# Patient Record
Sex: FEMALE | Employment: STUDENT | URBAN - METROPOLITAN AREA
[De-identification: names, ages, dates, MRNs, and addresses within clinical notes are randomized per-mention and may not be internally consistent; named-entity substitution may affect disease eponyms.]

---

## 2023-07-24 ENCOUNTER — OFFICE VISIT (OUTPATIENT)
Age: 23
End: 2023-07-24
Payer: COMMERCIAL

## 2023-07-24 VITALS — HEIGHT: 61 IN | RESPIRATION RATE: 14 BRPM | WEIGHT: 106 LBS | BODY MASS INDEX: 20.01 KG/M2

## 2023-07-24 DIAGNOSIS — S06.0XAA CONCUSSION WITH LOSS OF CONSCIOUSNESS STATUS UNKNOWN, INITIAL ENCOUNTER: Primary | ICD-10-CM
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
## 2023-07-24 DIAGNOSIS — M99.06 LOWER LIMB REGION SOMATIC DYSFUNCTION: ICD-10-CM

## 2023-07-24 DIAGNOSIS — M99.04 SACRAL REGION SOMATIC DYSFUNCTION: ICD-10-CM

## 2023-07-24 DIAGNOSIS — M99.08 RIB CAGE REGION SOMATIC DYSFUNCTION: ICD-10-CM

## 2023-07-24 DIAGNOSIS — M99.07 UPPER EXTREMITY SOMATIC DYSFUNCTION: ICD-10-CM

## 2023-07-24 DIAGNOSIS — M99.02 THORACIC REGION SOMATIC DYSFUNCTION: ICD-10-CM

## 2023-07-24 DIAGNOSIS — M99.05 PELVIC SOMATIC DYSFUNCTION: ICD-10-CM

## 2023-07-24 DIAGNOSIS — M99.03 LUMBAR REGION SOMATIC DYSFUNCTION: ICD-10-CM

## 2023-07-24 DIAGNOSIS — M99.01 CERVICAL SOMATIC DYSFUNCTION: ICD-10-CM

## 2023-07-24 DIAGNOSIS — M99.09 SOMATIC DYSFUNCTION OF ABDOMINAL REGION: ICD-10-CM

## 2023-07-24 PROCEDURE — 99204 OFFICE O/P NEW MOD 45 MIN: CPT | Performed by: FAMILY MEDICINE

## 2023-07-24 PROCEDURE — 98929 OSTEOPATH MANJ 9-10 REGIONS: CPT | Performed by: FAMILY MEDICINE

## 2023-07-24 RX ORDER — MIRTAZAPINE 7.5 MG/1
22.5-45 TABLET, FILM COATED ORAL NIGHTLY
Qty: 150 TABLET | Refills: 0 | Status: SHIPPED | OUTPATIENT
Start: 2023-07-24
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
## 2023-07-24 NOTE — PROGRESS NOTES
AVS reviewed: yes,   HEP: N/A  Resources Provided: no   Patient questions/concerns answered: yes,   Patient verbalized understanding of treatment plan: yes, Chemstrips per protocol

## 2023-08-01 ENCOUNTER — HOSPITAL ENCOUNTER (OUTPATIENT)
Facility: HOSPITAL | Age: 23
Setting detail: RECURRING SERIES
Discharge: HOME | End: 2023-08-04
Payer: MEDICAID

## 2023-08-01 DIAGNOSIS — S06.0XAA CONCUSSION WITH LOSS OF CONSCIOUSNESS STATUS UNKNOWN, INITIAL ENCOUNTER: Primary | ICD-10-CM
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
## 2023-08-01 PROCEDURE — 97163 PT EVAL HIGH COMPLEX 45 MIN: CPT
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
## 2023-08-07 ENCOUNTER — OFFICE VISIT (OUTPATIENT)
Age: 23
End: 2023-08-07
Payer: MEDICAID

## 2023-08-07 VITALS — WEIGHT: 106 LBS | HEIGHT: 61 IN | RESPIRATION RATE: 14 BRPM | BODY MASS INDEX: 20.01 KG/M2

## 2023-08-07 DIAGNOSIS — M99.05 PELVIC SOMATIC DYSFUNCTION: ICD-10-CM

## 2023-08-07 DIAGNOSIS — S06.0XAD CONCUSSION WITH LOSS OF CONSCIOUSNESS STATUS UNKNOWN, SUBSEQUENT ENCOUNTER: Primary | ICD-10-CM

## 2023-08-07 DIAGNOSIS — M99.08 RIB CAGE REGION SOMATIC DYSFUNCTION: ICD-10-CM

## 2023-08-07 DIAGNOSIS — M99.01 CERVICAL SOMATIC DYSFUNCTION: ICD-10-CM
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
## 2023-08-07 DIAGNOSIS — M99.04 SACRAL REGION SOMATIC DYSFUNCTION: ICD-10-CM

## 2023-08-07 DIAGNOSIS — M99.03 LUMBAR REGION SOMATIC DYSFUNCTION: ICD-10-CM

## 2023-08-07 DIAGNOSIS — M99.06 LOWER LIMB REGION SOMATIC DYSFUNCTION: ICD-10-CM

## 2023-08-07 DIAGNOSIS — M99.07 UPPER EXTREMITY SOMATIC DYSFUNCTION: ICD-10-CM

## 2023-08-07 DIAGNOSIS — M99.02 THORACIC REGION SOMATIC DYSFUNCTION: ICD-10-CM

## 2023-08-07 DIAGNOSIS — M99.09 SOMATIC DYSFUNCTION OF ABDOMINAL REGION: ICD-10-CM

## 2023-08-07 PROCEDURE — 98929 OSTEOPATH MANJ 9-10 REGIONS: CPT | Performed by: FAMILY MEDICINE

## 2023-08-07 PROCEDURE — 99214 OFFICE O/P EST MOD 30 MIN: CPT | Performed by: FAMILY MEDICINE

## 2023-08-07 NOTE — PROGRESS NOTES
region  [address] [place] [address] 9-10 [address]          Patient (or guardian if minor) verbalizes understanding of evaluation and plan. Verbal consent obtained. Cervical, Thoracic, Rib, Lumbar, Pelvic, Sacral, Upper Ext, Lower Ext, and Abdominal SD treated with Myofascial and ME. Correction of previous malalignments verified after Tx. Pt tolerated well. Notes improvement of Sx and pain is now rated 0/10. HEP/stretches daily. Discussed stretching/strengthening/posture. Will continue avoid aggravating activities and start PT and speech w/ continued remeron 22.5mg at bed. Return 4 weeks. School starts [id number]. Total time spent on encounter including chart/imaging/lab review and evaluation/documentation/demo home program/coordination of care/form completion but not including time for any procedures/manipulation 32 minutes.

## 2023-08-15 ENCOUNTER — HOSPITAL ENCOUNTER (OUTPATIENT)
Facility: HOSPITAL | Age: 23
Setting detail: RECURRING SERIES
Discharge: HOME | End: 2023-08-18
Payer: MEDICAID

## 2023-08-15 PROCEDURE — 97530 THERAPEUTIC ACTIVITIES: CPT

## 2023-08-15 PROCEDURE — 96125 COGNITIVE TEST BY HC PRO: CPT

## 2023-08-15 PROCEDURE — 97110 THERAPEUTIC EXERCISES: CPT

## 2023-08-15 PROCEDURE — 97116 GAIT TRAINING THERAPY: CPT

## 2023-08-15 NOTE — PROGRESS NOTES
tissue restrictions, analyze and cue for proper movement patterns, analyze and modify for postural abnormalities, analyze and address imbalance/dizziness, and instruct in home and community integration to address functional deficits and attain remaining goals. Progress toward goals / Updated goals:  []  See Progress Note/Recertification    Progressing toward goals:     Short Term Goals: To be accomplished in 2-4 weeks   Patient will demonstrate compliance with HEP for symptom management. Status at IE NA  Current Status Initiated  Long Term Goals: To be accomplished in 4-8 weeks   Patient will demonstrate independence with HEP for symptom management. Status at IE NA  Current Status Initiated  2. Patient will score less than or equal to 30/132 on CSI to allow improved activity tolerance. Status at /132  Current Status 92/132  3. Patient will demonstrate total scores < 10 on VOMS to allow improved activity tolerance. Status at IE Max total 22  4. Patient will maintain sharpened Romberg eyes open 30\"B to increase safety in challenging environments.   Status at IE 1\" left/3\" right    PLAN  Yes  Continue plan of care  [x]  Upgrade activities as tolerated  []  Discharge due to :  []  Other:    Adrianne Sullivan, PT    8/15/2023    2:43 PM    Future Appointments   Date Time Provider [address]  [facility]   8/17/2023 11:00 AM MD Doc [facility]   8/17/2023  2:00 PM Lavonia Boxer, PTA [facility]   8/23/2023  9:20 AM Lavonia Boxer, PTA [facility]   8/24/2023 10:00 AM Lavonia Boxer, PTA [facility]   9/5/2023  2:40 PM Orlando Judd DO VOSSTR BS AMB   9/15/2023  9:20 AM DO FIDE Turcios BS AMB
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
## 2023-08-15 NOTE — PROGRESS NOTES
[facility] THERAPY  [address] [facility], [address] [place] [address] [address] [phone]  Fx: [phone]    Plan of Care/ Statement of Necessity for Speech Therapy Services    Patient Name: Ene Brady : 2000   Medical   Diagnosis: Dysphasia [R47.02]  Concussion [S06. 0XAA] Treatment Diagnosis: R41.841 Cognitive communication deficit   Onset Date: 2023     Referral Source: Luis M Esquivel DO Start of Care [facility]): 8/15/23   Prior Hospitalization: See medical history Provider #: [id number]   Prior Level of Function: All aspects of speech/language, cognition, voice, and swallowing WNLs   Comorbidities: Headache/migraine, neck/back pain, depression, anxiety, IBS, interstitial cystitis, sleep dysfunction     The Plan of Care and following information is based on the information from the initial evaluation. Assessment/ key information:   OUTPATIENT COGNITIVE-COMMUNICATION EVALUATION  SLP administered portions of the [facility] [employer] [employer] Assessment-2nd Edition, a comprehensive, norm-referenced, assessment battery designed to identify, describe, and quantify cognitive-linguistic problems. The following are the pt's performance in each subtest.      Subtest Raw Score Standardized Score Percentile Severity   I. Immediate Memory 25 12 75 Moderate   II. Recent Memory 20 8 25 Marked   III. Temporal Orientation (Recent Memory) 26 10 50 Marked   IV. Temporal Orientation (Remote Memory) Not administered         V. Spatial Orientation Not administered         VI. Orientation to Environment Not administered         VII. Recall of General Information 20 9 37 Marked   VIII. Problem Solving and Abstract Reasoning 20 9 37 Marked   IX. Organization Not administered         X.  Auditory Processing and Retention 25 10 50 Marked       Impression: Pt demonstrates with mod-severe cognitive-linguistic impairment c/b deficits in STM, LTM, delayed recall, problem-solving, and auditory

## 2023-08-17 ENCOUNTER — HOSPITAL ENCOUNTER (OUTPATIENT)
Facility: HOSPITAL | Age: 23
Setting detail: RECURRING SERIES
Discharge: HOME | End: 2023-08-20
Payer: MEDICAID

## 2023-08-17 PROCEDURE — 97530 THERAPEUTIC ACTIVITIES: CPT

## 2023-08-17 PROCEDURE — 97112 NEUROMUSCULAR REEDUCATION: CPT

## 2023-08-17 NOTE — PROGRESS NOTES
Open Access Colonoscopy ordered.  [facility]      Dx: Colon cancer screening   PHYSICAL / OCCUPATIONAL THERAPY - DAILY TREATMENT NOTE (updated )    Patient Name: Anika Dunaway    Date: 2023    : 2000  Insurance: Payor: Moe Michelle / Plan: Luis Manuel NUNN HEALTHKEEPERS PLUS / Product Type: *No Product type* /      Patient  verified yes     Visit #   Current / Total 3 8-16   Time   In / Out 2:01 2:40   Pain   In / Out 4 mid to lower back 4 mid to lower back   5/10 HA   Subjective Functional Status/Changes: Pt reports her neck felt looser after doing the neck exercises last session. Pt reports she is still taking medication to help her get to sleep and stay asleep. Next PN/ RC due 23/NA  Auth due 23    TREATMENT AREA =  Unsteadiness on feet [R26.81]  Concussion [S06. 0XAA]    OBJECTIVE    Therapeutic Procedures: Tx Min Billable or 1:1 Min (if diff from Tx Min) Procedure, Rationale, Specifics   10  29091 Therapeutic Activity (timed):  use of dynamic activities replicating functional movements to increase ROM, strength, coordination, balance, and proprioception in order to improve patient's ability to progress to PLOF and address remaining functional goals. (see flow sheet as applicable)     Details if applicable:     29  03811 Neuromuscular Re-Education (timed):  improve balance, coordination, kinesthetic sense, posture, core stability and proprioception to improve patient's ability to develop conscious control of individual muscles and awareness of position of extremities in order to progress to PLOF and address remaining functional goals.  (see flow sheet as applicable)     Details if applicable:     44  [facility] Totals Reminder: bill using total billable min of TIMED therapeutic procedures (example: do not include dry needle or estim unattended, both untimed codes, in totals to left)  8-22 min = 1 unit; 23-37 min = 2 units; 38-52 min = 3 units; 53-67 min = 4 units; 68-82 min = 5 units   Total Total     [x]  Patient Education billed concurrently

## 2023-08-23 ENCOUNTER — HOSPITAL ENCOUNTER (OUTPATIENT)
Facility: HOSPITAL | Age: 23
Setting detail: RECURRING SERIES
Discharge: HOME | End: 2023-08-26
Payer: MEDICAID

## 2023-08-23 PROCEDURE — 97530 THERAPEUTIC ACTIVITIES: CPT

## 2023-08-23 PROCEDURE — 97110 THERAPEUTIC EXERCISES: CPT

## 2023-08-23 PROCEDURE — 97112 NEUROMUSCULAR REEDUCATION: CPT

## 2023-08-23 NOTE — PROGRESS NOTES
PHYSICAL / OCCUPATIONAL THERAPY - DAILY TREATMENT NOTE (updated )    Patient Name: Allie Quintero    Date: 2023    : 2000  Insurance: Payor: Jamey Chopra / Plan: Meet NUNN HEALTHKEEPERS PLUS / Product Type: *No Product type* /      Patient  verified yes     Visit #   Current / Total 4 8-16   Time   In / Out 9:20 9:58   Pain   In / Out 1-2 stiffness C/S  0   Subjective Functional Status/Changes: Pt reports just woke up so hasn't had any symptoms today yet. Pt notes her symptoms aren't typically too bad in the mornings, worsen as the day goes on. Pt reports difficulty staying asleep, but does have thyroid issues too that she just found out about. Pt states she has stiffness in her neck when she wakes up in the mornings. Pt reports mild symptoms with doing the [employer] exercises, states she gets 6-7 reps most of the time and can make it to 10 sometimes. Next PN due 23  RC NA  Auth date 23-16 visits auth    TREATMENT AREA =  Unsteadiness on feet [R26.81]  Concussion [S06. 0XAA]    OBJECTIVE    Therapeutic Procedures: Tx Min Billable or 1:1 Min (if diff from Tx Min) Procedure, Rationale, Specifics   15  32299 Therapeutic Exercise (timed):  increase ROM, strength, coordination, balance, and proprioception to improve patient's ability to progress to PLOF and address remaining functional goals. (see flow sheet as applicable)     Details if applicable:       13  74823 Therapeutic Activity (timed):  use of dynamic activities replicating functional movements to increase ROM, strength, coordination, balance, and proprioception in order to improve patient's ability to progress to PLOF and address remaining functional goals.   (see flow sheet as applicable)     Details if applicable:     10  71773 Neuromuscular Re-Education (timed):  improve balance, coordination, kinesthetic sense, posture, core stability and proprioception to improve patient's ability to

## 2023-08-24 ENCOUNTER — HOSPITAL ENCOUNTER (OUTPATIENT)
Facility: HOSPITAL | Age: 23
Setting detail: RECURRING SERIES
Discharge: HOME | End: 2023-08-27
Payer: MEDICAID

## 2023-08-24 PROCEDURE — 97530 THERAPEUTIC ACTIVITIES: CPT

## 2023-08-24 PROCEDURE — 97112 NEUROMUSCULAR REEDUCATION: CPT

## 2023-08-24 NOTE — PROGRESS NOTES
PHYSICAL / OCCUPATIONAL THERAPY - DAILY TREATMENT NOTE (updated )    Patient Name: Ally Fajardo    Date: 2023    : 2000  Insurance: Payor: Hortensia Iglesias / Plan: Bradly NUNN HEALTHKEEPERS PLUS / Product Type: *No Product type* /      Patient  verified yes     Visit #   Current / Total 5 8-16   Time   In / Out 10:05 10:40   Pain   In / Out 2 neck 2 neck   Subjective Functional Status/Changes: Pt reports nausea didn't last after her last PT session. Pt reports doing okay with the balance exercises at home, feels a little dizzy with the eyes closed, but it doesn't last at all. Next PN due 23  RC NA  Auth date 23-16 visits auth    TREATMENT AREA =  Unsteadiness on feet [R26.81]  Concussion [S06. 0XAA]    OBJECTIVE    Therapeutic Procedures: Tx Min Billable or 1:1 Min (if diff from Tx Min) Procedure, Rationale, Specifics   10  32013 Therapeutic Activity (timed):  use of dynamic activities replicating functional movements to increase ROM, strength, coordination, balance, and proprioception in order to improve patient's ability to progress to PLOF and address remaining functional goals. (see flow sheet as applicable)     Details if applicable:     25  78129 Neuromuscular Re-Education (timed):  improve balance, coordination, kinesthetic sense, posture, core stability and proprioception to improve patient's ability to develop conscious control of individual muscles and awareness of position of extremities in order to progress to PLOF and address remaining functional goals.  (see flow sheet as applicable)     Details if applicable:     28  [facility] Totals Reminder: bill using total billable min of TIMED therapeutic procedures (example: do not include dry needle or estim unattended, both untimed codes, in totals to left)  8-22 min = 1 unit; 23-37 min = 2 units; 38-52 min = 3 units; 53-67 min = 4 units; 68-82 min = 5 units   Total Total     [x]  Patient Education billed

## 2023-08-29 ENCOUNTER — APPOINTMENT (OUTPATIENT)
Facility: HOSPITAL | Age: 23
End: 2023-08-29
Payer: MEDICAID

## 2023-08-31 ENCOUNTER — HOSPITAL ENCOUNTER (OUTPATIENT)
Facility: HOSPITAL | Age: 23
Setting detail: RECURRING SERIES
End: 2023-08-31
Payer: MEDICAID

## 2023-08-31 PROCEDURE — 97110 THERAPEUTIC EXERCISES: CPT

## 2023-08-31 PROCEDURE — 97112 NEUROMUSCULAR REEDUCATION: CPT

## 2023-08-31 PROCEDURE — 97530 THERAPEUTIC ACTIVITIES: CPT

## 2023-08-31 NOTE — PROGRESS NOTES
[address] [facility],[address] [facility] AT [facility]  [address] [facility] 300. [facility], [address] [place], [place] [address]y 6   Phone: [phone] Fax: [phone]  PROGRESS NOTE  Patient Name: Katrin Lane : 2000   Treatment/Medical Diagnosis: R26.81  Unsteadiness on feet  S06. 0XAA Concussion   Referral Source: Alcira Mack DO     Date of Initial Visit: 2023 Attended Visits: 6 Missed Visits: 0     SUMMARY OF TREATMENT  Treatment has consisted of initial evaluation and 5 treatment sessions, which have included ROM/stretching/strengthening exercises, oculomotor and vestibular habituation exercises, balance training, functional mobility/gait training and patient education (including HEP). CURRENT STATUS  Patient has progressed with exercises in clinic and reports compliance with HEP. Patient continues to report headache and neck pain (3-/510 this session), as well as fatigue and light/sound sensitivity. Concussion Symptom Inventory score has fluctuated, but has decreased overall. CSI:   = 106/132  8/15 = 92/132  8 = 74/132   = 43/132   = 75/132  FOTO improved from 43 to 47. Functional Gait Assessment improved from  to , indicating improved balance/decreased fall risk. Gait speed as measured by 10-meter walk test improved from 0.67 m/s to 0.75 m/s, remains limited community ambulation. Patient now able to maintain sharpened Romberg eyes open 30\" right/13\" left. VOMS baseline and max total scores improved from 21 baseline and 22 max; however, patient reporting more increase in symptoms during VOMS and convergence distance increased from 4.7 cm average.     Vestibular Ocular Motor Test     Not Tested Headache  (0-10) Dizzines  (0-10) Nausea  (0-10) Fogginess  (0-10) Totals Comments   Baseline Symptoms (0-10 scale)         3 0 0 3 6     Smooth Pursuits (2 reps each horizontal and vertical)   3 0 0 3 6 Reports eye strain   Saccades - Horizontal (10 reps)   4 3 0 3 10 Reports eye

## 2023-08-31 NOTE — PROGRESS NOTES
PHYSICAL / OCCUPATIONAL THERAPY - DAILY TREATMENT NOTE (updated )    Patient Name: Matt Tesfaye    Date: 2023    : 2000  Insurance: Payor: Patti Graham / [facility] / Product Type: *No Product type* /      Patient  verified Yes     Visit #   Current / Total 6 -16   Time   In / Out 2:00 2:38   Pain   In / Out 5/10 neck, 3/10 HA 5/10 neck, HA   Subjective Functional Status/Changes: Pt reports that she has been really tired, sensitive to light/sound. Pt reports that she was washing dishes yesterday and dropped a bowl, reports that the sound of it dropping made it feel like her eyes were zooming out and in. Pt reports neck stiffness, 5/10, and headache, 3/10. Pt denies dizziness at start of session. Pt reports that she is doing fine with her exercises at home. Next PN/ RC due 2023  Auth due 16 visits, expires 2023 -  this session    TREATMENT AREA =  Unsteadiness on feet [R26.81]  Concussion [S06. 0XAA]    OBJECTIVE    Therapeutic Procedures: Tx Min Billable or 1:1 Min (if diff from Tx Min) Procedure, Rationale, Specifics        20  [id number] Neuromuscular Re-Education (timed):  improve balance, coordination, kinesthetic sense, posture, core stability and proprioception to improve patient's ability to develop conscious control of individual muscles and awareness of position of extremities in order to progress to PLOF and address remaining functional goals. (see flow sheet as applicable)     Details if applicable:  CSI, VOMS, SR EO   8  45199 Therapeutic Activity (timed):  use of dynamic activities replicating functional movements to increase ROM, strength, coordination, balance, and proprioception in order to improve patient's ability to progress to PLOF and address remaining functional goals.   (see flow sheet as applicable)     Details if applicable:  FOTO   10  07120 Gait Training (timed):   to improve safety and dynamic movement with Note/Recertification    PLAN  Yes  Continue plan of care  [x]  Upgrade activities as tolerated  []  Discharge due to :  []  Other:    Regan Barr, PT    8/31/2023    2:05 PM    Future Appointments   Date Time Provider [address]   9/5/2023 10:00  [address], [place] [facility]   9/5/2023 10:40 AM Zuleika Carson, PT [facility]   9/5/2023  2:40 PM DO NUNU MooreSSTR BS AMB   9/7/2023  1:20 PM Regan Barr, PT [facility]   9/7/2023  2:00 PM [address] [place] [address], SLP [facility]   9/12/2023  1:20 PM Zuleika Carson, PT [facility]   9/12/2023  2:00  [address], SLP [facility]   9/14/2023  1:20 PM Zuleika Carson, PT [facility]   9/15/2023  9:20 AM Andrew Schofield DO VSGS BS AMB   9/19/2023  1:20 PM Sandi Yang PTA [facility]   9/19/2023  2:00  [address], [place] [facility]   9/22/2023 12:40 PM Ana Laura Broderick PTA [facility]   9/22/2023  1:20  [address], [place] [facility]   9/26/2023  1:20  [address], [place] [facility]   9/26/2023  2:00 PM Regan Barr, PT [facility]   9/28/2023  1:20  [address], [place] [facility]   9/28/2023  2:00 PM Ana Laura Broderick, PTA [facility]   8/19/2024 11:40 AM Antonina Dias PA-C [facility] Jeannine Sched

## 2023-09-05 ENCOUNTER — APPOINTMENT (OUTPATIENT)
Facility: HOSPITAL | Age: 23
End: 2023-09-05
Payer: MEDICAID

## 2023-09-05 ENCOUNTER — OFFICE VISIT (OUTPATIENT)
Age: 23
End: 2023-09-05
Payer: MEDICAID

## 2023-09-05 ENCOUNTER — HOSPITAL ENCOUNTER (OUTPATIENT)
Facility: HOSPITAL | Age: 23
Setting detail: RECURRING SERIES
Discharge: HOME | End: 2023-09-08
Payer: MEDICAID

## 2023-09-05 VITALS — RESPIRATION RATE: 14 BRPM | BODY MASS INDEX: 19.16 KG/M2 | WEIGHT: 115 LBS | HEIGHT: 65 IN

## 2023-09-05 DIAGNOSIS — M99.04 SACRAL REGION SOMATIC DYSFUNCTION: ICD-10-CM

## 2023-09-05 DIAGNOSIS — M99.09 SOMATIC DYSFUNCTION OF ABDOMINAL REGION: ICD-10-CM

## 2023-09-05 DIAGNOSIS — M99.03 LUMBAR REGION SOMATIC DYSFUNCTION: ICD-10-CM
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
## 2023-09-05 DIAGNOSIS — S06.0XAD CONCUSSION WITH LOSS OF CONSCIOUSNESS STATUS UNKNOWN, SUBSEQUENT ENCOUNTER: Primary | ICD-10-CM

## 2023-09-05 DIAGNOSIS — M99.02 THORACIC REGION SOMATIC DYSFUNCTION: ICD-10-CM

## 2023-09-05 DIAGNOSIS — M99.01 CERVICAL SOMATIC DYSFUNCTION: ICD-10-CM

## 2023-09-05 DIAGNOSIS — M99.05 PELVIC SOMATIC DYSFUNCTION: ICD-10-CM

## 2023-09-05 DIAGNOSIS — M99.08 RIB CAGE REGION SOMATIC DYSFUNCTION: ICD-10-CM

## 2023-09-05 DIAGNOSIS — M99.06 LOWER LIMB REGION SOMATIC DYSFUNCTION: ICD-10-CM

## 2023-09-05 DIAGNOSIS — M99.07 UPPER EXTREMITY SOMATIC DYSFUNCTION: ICD-10-CM

## 2023-09-05 PROCEDURE — 99213 OFFICE O/P EST LOW 20 MIN: CPT | Performed by: FAMILY MEDICINE

## 2023-09-05 PROCEDURE — 97530 THERAPEUTIC ACTIVITIES: CPT

## 2023-09-05 PROCEDURE — 97112 NEUROMUSCULAR REEDUCATION: CPT

## 2023-09-05 PROCEDURE — 97110 THERAPEUTIC EXERCISES: CPT

## 2023-09-05 PROCEDURE — 98929 OSTEOPATH MANJ 9-10 REGIONS: CPT | Performed by: FAMILY MEDICINE

## 2023-09-05 NOTE — PROGRESS NOTES
HISTORY OF PRESENT ILLNESS    Pawan Burns 2000 is a 26y.o. year old female comes in today to be evaluated and treated for: concussion    Since last appt has noticed pain is improving with PT 2-3/week and speech therapy has had eval appt, next is 9/7/2023 but pending auth. Pain level 3/10 crown head dull ache will sharp/throb when bad. Using Remeron 22.5mg with benefit. Plans to attend school online starting soon. Photophobia: improved Phonophobia: improved Sleep issues: great Remeron 22.5mg/day More emotional: improved some Dizziness: less Nausea: rare LOC: yes at injury Trouble concentrating/foggy feeling: improved but issues screen time     Hx prior concussion(s): no     Hx depression - Remeron 15mg before this injury. Did have migraines as child. IMAGING: CT head 6/5/2023  No acute traumatic intracranial findings. CT cervical 6/5/2023  No acute fracture or traumatic malalignment of the cervical spine. History reviewed. No pertinent surgical history. Social History     Socioeconomic History    Marital status: Single     Spouse name: None    Number of children: None    Years of education: None    Highest education level: None   Tobacco Use    Smoking status: Never    Smokeless tobacco: Never   Vaping Use    Vaping Use: Never used   Substance and Sexual Activity    Alcohol use: Yes    Drug use: Yes     Types: Marijuana Saud Harm)     Current Outpatient Medications   Medication Sig Dispense Refill    mirtazapine (REMERON) 7.5 MG tablet Take 3-6 tablets by mouth nightly Start 3 tablet at bed. If sleep not improved may increase dose by 1 tablet every 3 days up to max dose 6 tablets. 150 tablet 0    dicyclomine (BENTYL) 10 MG capsule       mirtazapine (REMERON) 15 MG tablet       oxybutynin (DITROPAN XL) 5 MG extended release tablet Take 1 tablet by mouth daily 30 tablet 3     No current facility-administered medications for this visit. History reviewed.  No pertinent past medical

## 2023-09-05 NOTE — PROGRESS NOTES
PHYSICAL THERAPY - DAILY TREATMENT NOTE (updated )    Patient Name: Sundeep Welch    Date: 2023    : 2000  Insurance: Payor: Yesika Drafts / Plan: Genna NUNN [employer]KEEPERS PLUS / Product Type: *No Product type* /      Patient  verified yes     Visit #   Current / Total 7 8-16   Time   In / Out 10:42 11:24   Pain   In / Out 3/10 3/10   Subjective Functional Status/Changes: Reports compliance with HEP. TREATMENT AREA =  Unsteadiness on feet [R26.81]  Concussion [S06. 0XAA]    Next PN/ RC due 2023  Auth due 16 visits, expires 2023 -  this session    OBJECTIVE    Therapeutic Procedures: Tx Min Billable or 1:1 Min (if diff from Tx Min) Procedure, Rationale, Specifics   15 15 [id number] Therapeutic Exercise (timed):  increase ROM, strength, coordination, balance, and proprioception to improve patient's ability to progress to PLOF and address remaining functional goals. (see flow sheet as applicable)     Details if applicable:       19 15 69527 Therapeutic Activity (timed):  use of dynamic activities replicating functional movements to increase ROM, strength, coordination, balance, and proprioception in order to improve patient's ability to progress to PLOF and address remaining functional goals. (see flow sheet as applicable)     Details if applicable:      76869 Neuromuscular Re-Education (timed):  improve balance, coordination, kinesthetic sense, posture, core stability and proprioception to improve patient's ability to develop conscious control of individual muscles and awareness of position of extremities in order to progress to PLOF and address remaining functional goals.  (see flow sheet as applicable)     Details if applicable:     42 45 [facility] Totals Reminder: bill using total billable min of TIMED therapeutic procedures (example: do not include dry needle or estim unattended, both untimed codes, in totals to left)  8-22 min = 1 unit; 23-37 min = 2 units; 38-52 min

## 2023-09-07 ENCOUNTER — HOSPITAL ENCOUNTER (OUTPATIENT)
Facility: HOSPITAL | Age: 23
Setting detail: RECURRING SERIES
Discharge: HOME | End: 2023-09-10
Payer: MEDICAID

## 2023-09-07 PROCEDURE — 97130 THER IVNTJ EA ADDL 15 MIN: CPT

## 2023-09-07 PROCEDURE — 97129 THER IVNTJ 1ST 15 MIN: CPT

## 2023-09-07 PROCEDURE — 97530 THERAPEUTIC ACTIVITIES: CPT

## 2023-09-07 PROCEDURE — 97112 NEUROMUSCULAR REEDUCATION: CPT

## 2023-09-07 PROCEDURE — 97110 THERAPEUTIC EXERCISES: CPT

## 2023-09-07 NOTE — PROGRESS NOTES
right  Current Status 30\"B, MET    PLAN  Yes  Continue plan of care  [x]  Upgrade activities as tolerated  []  Discharge due to :  []  Other:    Charlene Dias, PT    9/7/2023    1:26 PM    Future Appointments   Date Time Provider [address]   9/7/2023  2:00   [facility]   9/12/2023  1:20 PM Mono Bagley, PT [facility]   9/12/2023  2:00  [facility]   9/14/2023  1:20 PM Mono Bagley PT [facility]   9/15/2023  9:20 AM Andrew Schofield DO [facility] AMB   9/19/2023  1:20 PM Carola Murphy PTA [id number]   9/19/2023  2:00  [facility]   9/22/2023 12:40 PM Carola Murphy PTA [id number]   9/22/2023  1:20 PM [address] [place] [address] [facility]   9/26/2023  1:20  [facility]   9/26/2023  2:00 PM Charlene Dias, PT [facility]   9/28/2023  1:20 PM [address] [place] [address] [facility]   9/28/2023  2:00 PM Carola Murphy PTA [facility]   10/3/2023  3:00 PM DO ELIUD Jones  AMB   8/19/2024 11:40 AM Antonina Cunningham PA-C [facility] Sched

## 2023-09-07 NOTE — PROGRESS NOTES
associations, therapy notebook, and chunking/grouping) and use them appropriately in 70% of opportunities given with modA to increase short-term recall to increase safety in the functional living environment. Current: Pt accurately utilized chunking/grouping to recall a lit of 7 items after a 5 min delay x2 with 100% acc LG and increased response time. She was educ re: use of WRAP and chunking/grouping as memory comp strategies. Pt agreeable. Handout provided. -progressing/continue addressing. 2) Pt will increasing auditory processing by recall information re: 2-3 sentence paragraphs/instructions/novel information presented orally with 70% acc given modA. Current: Not targeted this date. 3) Pt will immediately recall a list of at least 5 items (e.g., medication list, to-do list, novel information, etc)  with 70% acc given modA and use of comp strategies in various structured tasks to improve safety, independence and communication competence. Current: Pt accurately recalled a list of 7 items with 67% acc given min verbal cues to utilize chunking/grouping/associating strategies. -progressing/continue addressing. 4) Pt will complete simple-moderately complex arithmetic/safety awareness/ logical problem solving  tasks with 70% acc when provided with modA cues/ reps to promote IADLs within her environment. Current: Not targeted this date    PLAN  [x]  Continue plan of care  []  Modify Goals/Treatment Plan      []  Discharge due to:  [] Other:    Selena Biggs.  Liam Banda M.A., [facility]  Speech Language Pathologist   9/7/2023  8:34 AM    Future Appointments   Date Time Provider [address]   9/7/2023  1:20 PM Libertad Mercer PT [facility]   9/7/2023  2:00 PM [address] [place] [address] [facility]   9/12/2023  1:20 PM Zeina Loomis PT [facility]   9/12/2023  2:00 PM [address] [place] [address] [facility]   9/14/2023  1:20 PM Zeina Loomis, PT [facility]   9/15/2023  9:20 AM Andrew ALAS

## 2023-09-12 ENCOUNTER — APPOINTMENT (OUTPATIENT)
Facility: HOSPITAL | Age: 23
End: 2023-09-12
Payer: MEDICAID

## 2023-09-12 ENCOUNTER — HOSPITAL ENCOUNTER (OUTPATIENT)
Facility: HOSPITAL | Age: 23
Setting detail: RECURRING SERIES
Discharge: HOME | End: 2023-09-15
Payer: MEDICAID

## 2023-09-12 PROCEDURE — 97110 THERAPEUTIC EXERCISES: CPT

## 2023-09-12 PROCEDURE — 97530 THERAPEUTIC ACTIVITIES: CPT

## 2023-09-12 PROCEDURE — 97112 NEUROMUSCULAR REEDUCATION: CPT

## 2023-09-12 PROCEDURE — 97535 SELF CARE MNGMENT TRAINING: CPT

## 2023-09-12 NOTE — PROGRESS NOTES
PHYSICAL THERAPY - DAILY TREATMENT NOTE (updated )    Patient Name: Chris Lundberg    Date: 2023    : 2000  Insurance: Payor: Bryan Edwards / Plan: Candice NUNN [employer]KEEPERS PLUS / Product Type: *No Product type* /      Patient  verified yes     Visit #   Current / Total 11 8-16   Time   In / Out 1:20 2:02   Pain   In / Out 4/10 3/10   Subjective Functional Status/Changes: Reports increased symptoms recently and states she has not been sleeping well. TREATMENT AREA =  Unsteadiness on feet [R26.81]  Concussion [S06. 0XAA]    Next PN/ RC due: 2023  Auth due: 2023    OBJECTIVE    Therapeutic Procedures: Tx Min Billable or 1:1 Min (if diff from Tx Min) Procedure, Rationale, Specifics   8  54782 Therapeutic Exercise (timed):  increase ROM, strength, coordination, balance, and proprioception to improve patient's ability to progress to PLOF and address remaining functional goals. (see flow sheet as applicable)     Details if applicable:       10  00660 Therapeutic Activity (timed):  use of dynamic activities replicating functional movements to increase ROM, strength, coordination, balance, and proprioception in order to improve patient's ability to progress to PLOF and address remaining functional goals. (see flow sheet as applicable)     Details if applicable:     12  75679 Neuromuscular Re-Education (timed):  improve balance, coordination, kinesthetic sense, posture, core stability and proprioception to improve patient's ability to develop conscious control of individual muscles and awareness of position of extremities in order to progress to PLOF and address remaining functional goals. (see flow sheet as applicable)     Details if applicable:     12  65294 Self Care/Home Management (timed):  improve patient knowledge and understanding of activity modification  to improve patient's ability to progress to PLOF and address remaining functional goals.   (see flow sheet as

## 2023-09-14 ENCOUNTER — HOSPITAL ENCOUNTER (OUTPATIENT)
Facility: HOSPITAL | Age: 23
Setting detail: RECURRING SERIES
Discharge: HOME | End: 2023-09-17
Payer: MEDICAID

## 2023-09-14 PROCEDURE — 97530 THERAPEUTIC ACTIVITIES: CPT

## 2023-09-14 PROCEDURE — 97110 THERAPEUTIC EXERCISES: CPT

## 2023-09-14 PROCEDURE — 97112 NEUROMUSCULAR REEDUCATION: CPT

## 2023-09-14 PROCEDURE — 97116 GAIT TRAINING THERAPY: CPT

## 2023-09-14 NOTE — PROGRESS NOTES
PHYSICAL THERAPY - DAILY TREATMENT NOTE (updated )    Patient Name: Roxanna Dunham    Date: 2023    : 2000  Insurance: Payor: Zaheer Bates / Plan: Nora NUNN [employer]KEEPERS PLUS / Product Type: *No Product type* /      Patient  verified yes     Visit #   Current / Total 12 8-16   Time   In / Out 1:20 2:05   Pain   In / Out 3/10 2/10   Subjective Functional Status/Changes: States she did 10 reps of [place] exercises at home with mild dizziness. She denies any increased head pain from exercises. She reports a 60-70% improvement in symptoms since beginning therapy. TREATMENT AREA =  Unsteadiness on feet [R26.81]  Concussion [S06. 0XAA]    Next PN/ RC due: 2023  Auth due: 2023    OBJECTIVE    Therapeutic Procedures: Tx Min Billable or 1:1 Min (if diff from Tx Min) Procedure, Rationale, Specifics   15  42944 Therapeutic Exercise (timed):  increase ROM, strength, coordination, balance, and proprioception to improve patient's ability to progress to PLOF and address remaining functional goals. (see flow sheet as applicable)     Details if applicable:       14  01388 Therapeutic Activity (timed):  use of dynamic activities replicating functional movements to increase ROM, strength, coordination, balance, and proprioception in order to improve patient's ability to progress to PLOF and address remaining functional goals. (see flow sheet as applicable)     Details if applicable:     8  23864 Neuromuscular Re-Education (timed):  improve balance, coordination, kinesthetic sense, posture, core stability and proprioception to improve patient's ability to develop conscious control of individual muscles and awareness of position of extremities in order to progress to PLOF and address remaining functional goals. (see flow sheet as applicable)     Details if applicable:     8  53537 Gait Training (timed):    To improve safety and dynamic movement with household/community

## 2023-09-15 ENCOUNTER — OFFICE VISIT (OUTPATIENT)
Age: 23
End: 2023-09-15
Payer: MEDICAID

## 2023-09-15 VITALS — BODY MASS INDEX: 18.83 KG/M2 | HEIGHT: 65 IN | WEIGHT: 113 LBS

## 2023-09-15 DIAGNOSIS — M79.641 RIGHT HAND PAIN: ICD-10-CM

## 2023-09-15 DIAGNOSIS — R20.0 NUMBNESS OF RIGHT HAND: Primary | ICD-10-CM

## 2023-09-15 PROCEDURE — 99213 OFFICE O/P EST LOW 20 MIN: CPT | Performed by: ORTHOPAEDIC SURGERY

## 2023-09-19 ENCOUNTER — HOSPITAL ENCOUNTER (OUTPATIENT)
Facility: HOSPITAL | Age: 23
Setting detail: RECURRING SERIES
Discharge: HOME | End: 2023-09-22
Payer: MEDICAID

## 2023-09-19 ENCOUNTER — APPOINTMENT (OUTPATIENT)
Facility: HOSPITAL | Age: 23
End: 2023-09-19
Payer: MEDICAID

## 2023-09-19 PROCEDURE — 97530 THERAPEUTIC ACTIVITIES: CPT

## 2023-09-19 PROCEDURE — 97112 NEUROMUSCULAR REEDUCATION: CPT

## 2023-09-19 NOTE — PROGRESS NOTES
PHYSICAL / OCCUPATIONAL THERAPY - DAILY TREATMENT NOTE (updated )    Patient Name: Traci Schwartz    Date: 2023    : 2000  Insurance: Payor: Jose Dai / Plan: Luna NUNN HE[employer]KEEPERS PLUS / Product Type: *No Product type* /      Patient  verified yes     Visit #   Current / Total 13 8-16   Time   In / Out 1:24 2:05   Pain   In / Out C/o LBP   Intermittent right hand pain  3/10 neck pain  No change in pain with therapeutic exercise  3/10 neck pain   Subjective Functional Status/Changes: Pt reports she has an appt with ortho MD recently for right hand pain, pt states she has been having pain in right hand only and doctor is going to schedule her for an EMG for possible pinched nerve. Pt c/o new symptom of LBP, stating it has been going on for about a week, states she isn't sure if it has been there all along or if she is just noticing it, but it bothers her whenever her back is flexed so she has been trying to focus on keeping her back straight. Pt reports having less frequent HA's, decreased from daily to every other day. States last week she didn't sleep well, but this week has been better. Still taking  medication to help her sleep. Pt reports HA's are moderate and main sensitivity is to noise and light. Reports she doesn't avoid these things, but if she does start to get bothered will go in her room to get away from it. Next PN due   RC NA  Auth date 23    TREATMENT AREA =  Unsteadiness on feet [R26.81]  Concussion [S06. 0XAA]    OBJECTIVE    Therapeutic Procedures: Tx Min Billable or 1:1 Min (if diff from Tx Min) Procedure, Rationale, Specifics   15  59668 Therapeutic Activity (timed):  use of dynamic activities replicating functional movements to increase ROM, strength, coordination, balance, and proprioception in order to improve patient's ability to progress to PLOF and address remaining functional goals.   (see flow sheet as applicable)

## 2023-09-22 ENCOUNTER — HOSPITAL ENCOUNTER (OUTPATIENT)
Facility: HOSPITAL | Age: 23
Setting detail: RECURRING SERIES
Discharge: HOME | End: 2023-09-25
Payer: MEDICAID

## 2023-09-22 ENCOUNTER — APPOINTMENT (OUTPATIENT)
Facility: HOSPITAL | Age: 23
End: 2023-09-22
Payer: MEDICAID

## 2023-09-22 PROCEDURE — 97110 THERAPEUTIC EXERCISES: CPT

## 2023-09-22 PROCEDURE — 97116 GAIT TRAINING THERAPY: CPT

## 2023-09-22 PROCEDURE — 97530 THERAPEUTIC ACTIVITIES: CPT

## 2023-09-22 NOTE — PROGRESS NOTES
PHYSICAL / OCCUPATIONAL THERAPY - DAILY TREATMENT NOTE (updated )    Patient Name: Xi Gamino    Date: 2023    : 2000  Insurance: Payor: Odette Sparks / Plan: [facility] / Product Type: *No Product type* /      Patient  verified yes     Visit #   Current / Total 14 8-16   Time   In / Out 12:40 1:30   Pain   In / Out 5/10 LBP  3/10 neck pain 4/10   Subjective Functional Status/Changes: Pt reports back still hurting, she \"has been trying to do some stuff to make it better, but it's still sore. \"    Pt states she had actually forgot, but remembered she had fallen down the steps, had shoes on but the bottoms were slick, slid down the steps on her butt, thinks that is why her back started hurting. Pt states sometimes she will be just standing and loose her balance, reports she is able to catch her balance and not fall. Next PN due   RC NA  Auth date 23    TREATMENT AREA =  Unsteadiness on feet [R26.81]  Concussion [S06. 0XAA]    OBJECTIVE    Heat (Carmen Guan):  location/position: MHP to C/S, Patient supine with LE wedge   . Min Rationale   10 decrease pain to improve patient's ability to progress to PLOF and address remaining functional goals. Skin assessment post-treatment:   Intact     Therapeutic Procedures: Tx Min Billable or 1:1 Min (if diff from Tx Min) Procedure, Rationale, Specifics   17  64273 Therapeutic Exercise (timed):  increase ROM, strength, coordination, balance, and proprioception to improve patient's ability to progress to PLOF and address remaining functional goals. (see flow sheet as applicable)     Details if applicable:       15  41589 Therapeutic Activity (timed):  use of dynamic activities replicating functional movements to increase ROM, strength, coordination, balance, and proprioception in order to improve patient's ability to progress to PLOF and address remaining functional goals.   (see flow sheet as

## 2023-09-26 ENCOUNTER — HOSPITAL ENCOUNTER (OUTPATIENT)
Facility: HOSPITAL | Age: 23
Setting detail: RECURRING SERIES
Discharge: HOME | End: 2023-09-29
Payer: MEDICAID

## 2023-09-26 ENCOUNTER — APPOINTMENT (OUTPATIENT)
Facility: HOSPITAL | Age: 23
End: 2023-09-26
Payer: MEDICAID

## 2023-09-26 PROCEDURE — 97530 THERAPEUTIC ACTIVITIES: CPT

## 2023-09-26 PROCEDURE — 97116 GAIT TRAINING THERAPY: CPT

## 2023-09-26 PROCEDURE — 97110 THERAPEUTIC EXERCISES: CPT

## 2023-09-26 PROCEDURE — 97112 NEUROMUSCULAR REEDUCATION: CPT

## 2023-09-26 NOTE — PROGRESS NOTES
[address] [facility],[address] [facility]  [address] [address]   Phone: [phone] Fax: [phone]  PROGRESS NOTE  Patient Name: Madyson Dennis : 2000   Treatment/Medical Diagnosis: R26.81  Unsteadiness on feet  S06. 0XAA Concussion   Referral Source: Gilberto Mclean DO     Date of Initial Visit: 2023 Attended Visits: 15 Missed Visits: 0     SUMMARY OF TREATMENT  Treatment has consisted of initial evaluation and 14 treatment sessions, which have included ROM/stretching/strengthening exercises, oculomotor and vestibular habituation exercises, balance training, functional mobility/gait training, modalities for pain relief and patient education (including HEP). CURRENT STATUS  Patient has progressed with exercises in clinic and reports compliance with HEP. Patient continues to report headache and neck pain. Additionally, patient reports back pain, which she attributes to fall on stairs last week. Patient also reports dizziness and eye strain. Concussion Symptom Inventory continues to fluctuate (51/132 on , 45/132 on , 80/132 on ,  on , 68/13 on , 56 132 on , 64/132 on ). FOTO decreased slightly to 45. Functional Gait Assessment increased from  to , indicating decreased risk of fall. Gait speed as measured by 10-meter walk test unchanged at 0.75 m/s, limited community ambulation. Sharpened Romberg eyes open = 8\" left/30\" right, sharpened Romberg eyes closed = 5\" left/7\" right, single leg stance eyes open = 13\" left/10\" right. VOMS baseline total score elevated at 11; however, max total score only 3 points higher at 14. Convergence remains impaired at 8 cm average. C/S ROM remains limited.     Vestibular Ocular Motor Test    Not Tested Headache  (0-10) Dizziness  (0-10) Nausea  (0-10) Fogginess  (0-10) Totals Comments   Baseline Symptoms (0-10 scale)         3 3 1 4 11     Smooth Pursuits (2 reps
walk test   8  39770 Therapeutic Exercise (timed):  increase ROM, strength, coordination, balance, and proprioception to improve patient's ability to progress to PLOF and address remaining functional goals.  (see flow sheet as applicable)     Details if applicable:  ROM testing, HEP review        40  [facility] Totals Reminder: bill using total billable min of TIMED therapeutic procedures (example: do not include dry needle or estim unattended, both untimed codes, in totals to left)  8-22 min = 1 unit; 23-37 min = 2 units; 38-52 min = 3 units; 53-67 min = 4 units; 68-82 min = 5 units   Total Total     [x]  Patient Education billed concurrently with other procedures   [x] Review HEP    [] Progressed/Changed HEP, detail:    [] Other detail:       Objective Information/Functional Measures/Assessment    CSI = 64/132  FOTO = 45  FGA = 22/30  10-meter walk test = 8\" (0.75 m/s, limited community ambulation)    SR EO = 8\" left/30\" right  SR EC = 5\" left/7\" right  SLS EO = 13\" left/10\" right    Vestibular Ocular Motor Test   Not Tested Headache  (0-10) Dizziness  (0-10) Nausea  (0-10) Fogginess  (0-10) Totals Comments   Baseline Symptoms (0-10 scale)      3 3 1 4 11    Smooth Pursuits (2 reps each horizontal and vertical)  4 4 1 4 13 Eye strain   Saccades - Horizontal (10 reps)  4 4 1 4 13 Eye strain   Saccades - Vertical (10 reps)  4 4 1 4 13 Eye strain, slow movement   Convergence (near point) (3 reps)  4 4 1 4 13 Measure 1: 7 cm  Measure 2: 8 cm  Measure 3: 9 cm  Average: 8 cm  Eye strain   VOR - Horizontal (10 reps) - seated with metronome at 180 beats/min; focus on 14 point font A  4 4 1 4 13 Eye strain, slow movement, difficulty maintaining eyes on target   VOR - Vertical (10 reps) - seated with metronome at 180 beats/min, focus on 14 point font A  4 5 1 4 14 Eye strain,  slow movement   Visual Motion Sensitivity/ VOR Cancellation (5 reps) - standing with metronome at 50 beats/min; focus on thumb  4 5 1 4 14 Slow movement

## 2023-09-28 ENCOUNTER — APPOINTMENT (OUTPATIENT)
Facility: HOSPITAL | Age: 23
End: 2023-09-28
Payer: MEDICAID

## 2023-09-28 ENCOUNTER — HOSPITAL ENCOUNTER (OUTPATIENT)
Facility: HOSPITAL | Age: 23
Setting detail: RECURRING SERIES
End: 2023-09-28
Payer: MEDICAID

## 2023-09-28 PROCEDURE — 97110 THERAPEUTIC EXERCISES: CPT

## 2023-09-28 PROCEDURE — 97530 THERAPEUTIC ACTIVITIES: CPT

## 2023-09-28 PROCEDURE — 97112 NEUROMUSCULAR REEDUCATION: CPT

## 2023-09-28 NOTE — PROGRESS NOTES
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 Pt states she has been having more nausea lately. Reports she took some over the counter nausea medication today. Pt states when her parents go out of town she helps care for her Leonardo Flora which involves rolling him and changing him and she reports getting very fatigued afterward. Next PN due 10/26/23  RC NA  Auth date 23    TREATMENT AREA =  Unsteadiness on feet [R26.81]  Concussion [S06. 0XAA]    OBJECTIVE    Therapeutic Procedures: Tx Min Billable or 1:1 Min (if diff from Tx Min) Procedure, Rationale, Specifics   17  70272 Therapeutic Exercise (timed):  increase ROM, strength, coordination, balance, and proprioception to improve patient's ability to progress to PLOF and address remaining functional goals. (see flow sheet as applicable)     Details if applicable:       15  11038 Therapeutic Activity (timed):  use of dynamic activities replicating functional movements to increase ROM, strength, coordination, balance, and proprioception in order to improve patient's ability to progress to PLOF and address remaining functional goals.   (see flow sheet as applicable)     Details if applicable:     10  18218 Neuromuscular Re-Education (timed):  improve balance, coordination, kinesthetic sense, posture, core stability and proprioception to improve patient's ability to develop conscious control of individual muscles and awareness of position of extremities in order to progress to PLOF and address remaining functional Romberg eyes open 30\"B to increase safety in challenging environments. Status at IE 1\" left/3\" right  Current Status 30\" right/8\" left, progressing  : Non-Medicare, can change goals, can adjust or add frequency duration, no signature required         PLAN  yes Continue plan of care  []  Upgrade activities as tolerated  []  Discharge due to :  [x]  Other:  On hold awaiting authorization of continuation of care from [address] [place] [address]    9/28/2023    2:28 PM    Future Appointments   Date Time Provider [address]   10/3/2023  3:00 PM Angelita Correa DO [facility] BS AMB   10/24/2023  3:45 PM Marilee Miranda MD [facility] BS AMB   10/27/2023  9:45 AM Viki Bernal DO [facility] BS AMB   8/19/2024 11:40 AM Antonina Maxwell PA-C [facility] Sched

## 2023-10-05 ENCOUNTER — HOSPITAL ENCOUNTER (OUTPATIENT)
Facility: HOSPITAL | Age: 23
Setting detail: RECURRING SERIES
Discharge: HOME | End: 2023-10-08
Payer: MEDICAID

## 2023-10-05 PROCEDURE — 97530 THERAPEUTIC ACTIVITIES: CPT

## 2023-10-05 PROCEDURE — 97116 GAIT TRAINING THERAPY: CPT

## 2023-10-05 PROCEDURE — 97112 NEUROMUSCULAR REEDUCATION: CPT

## 2023-10-05 PROCEDURE — 97110 THERAPEUTIC EXERCISES: CPT

## 2023-10-05 NOTE — PROGRESS NOTES
PHYSICAL THERAPY - DAILY TREATMENT NOTE (updated )    Patient Name: Otoniel Fisher    Date: 10/5/2023    : 2000  Insurance: Payor: Danny Mujica / Plan: Azeem [employer] / Product Type: *No Product type* /      Patient  verified yes     Visit #   Current / Total 17 -27   Time   In / Out 11:31 12:06   Pain   In / Out 4/10 3/10   Subjective Functional Status/Changes: States she turned her head quickly the other day and feels like she strained a muscle. States she went to the MD (PCP) yesterday and they did an x ray on her lower back. States she was told she did not have a fracture in her low back. States she told them about her neck pain also but no testing was performed on her neck. States the only time she gets dizziness is when she is negotiating stairs. TREATMENT AREA =  Unsteadiness on feet [R26.81]  Concussion [S06. 0XAA]    Next PN due 10/26/23  RC NA  Auth date 2023    OBJECTIVE    Therapeutic Procedures: Tx Min Billable or 1:1 Min (if diff from Tx Min) Procedure, Rationale, Specifics   10  63424 Therapeutic Exercise (timed):  increase ROM, strength, coordination, balance, and proprioception to improve patient's ability to progress to PLOF and address remaining functional goals. (see flow sheet as applicable)     Details if applicable:       9  15659 Therapeutic Activity (timed):  use of dynamic activities replicating functional movements to increase ROM, strength, coordination, balance, and proprioception in order to improve patient's ability to progress to PLOF and address remaining functional goals.   (see flow sheet as applicable)     Details if applicable:     8  73387 Neuromuscular Re-Education (timed):  improve balance, coordination, kinesthetic sense, posture, core stability and proprioception to improve patient's ability to develop conscious control of individual muscles and awareness of position of extremities in order to progress to PLOF and

## 2023-10-09 ENCOUNTER — OFFICE VISIT (OUTPATIENT)
Age: 23
End: 2023-10-09
Payer: MEDICAID

## 2023-10-09 VITALS — BODY MASS INDEX: 18.3 KG/M2 | WEIGHT: 110 LBS

## 2023-10-09 DIAGNOSIS — M99.02 THORACIC REGION SOMATIC DYSFUNCTION: ICD-10-CM

## 2023-10-09 DIAGNOSIS — M99.06 LOWER LIMB REGION SOMATIC DYSFUNCTION: ICD-10-CM
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
## 2023-10-09 DIAGNOSIS — M99.07 UPPER EXTREMITY SOMATIC DYSFUNCTION: ICD-10-CM

## 2023-10-09 DIAGNOSIS — M99.08 RIB CAGE REGION SOMATIC DYSFUNCTION: ICD-10-CM

## 2023-10-09 DIAGNOSIS — M99.03 LUMBAR REGION SOMATIC DYSFUNCTION: ICD-10-CM

## 2023-10-09 DIAGNOSIS — M99.05 PELVIC SOMATIC DYSFUNCTION: ICD-10-CM

## 2023-10-09 DIAGNOSIS — S06.0XAD CONCUSSION WITH LOSS OF CONSCIOUSNESS STATUS UNKNOWN, SUBSEQUENT ENCOUNTER: Primary | ICD-10-CM

## 2023-10-09 DIAGNOSIS — M99.09 SOMATIC DYSFUNCTION OF ABDOMINAL REGION: ICD-10-CM

## 2023-10-09 DIAGNOSIS — M99.04 SACRAL REGION SOMATIC DYSFUNCTION: ICD-10-CM

## 2023-10-09 DIAGNOSIS — M99.01 CERVICAL SOMATIC DYSFUNCTION: ICD-10-CM

## 2023-10-09 PROCEDURE — 98929 OSTEOPATH MANJ 9-10 REGIONS: CPT | Performed by: FAMILY MEDICINE

## 2023-10-09 PROCEDURE — 99213 OFFICE O/P EST LOW 20 MIN: CPT | Performed by: FAMILY MEDICINE

## 2023-10-10 ENCOUNTER — HOSPITAL ENCOUNTER (OUTPATIENT)
Facility: HOSPITAL | Age: 23
Setting detail: RECURRING SERIES
Discharge: HOME | End: 2023-10-13
Payer: MEDICAID

## 2023-10-10 PROCEDURE — 97110 THERAPEUTIC EXERCISES: CPT

## 2023-10-10 PROCEDURE — 97530 THERAPEUTIC ACTIVITIES: CPT

## 2023-10-10 PROCEDURE — 97112 NEUROMUSCULAR REEDUCATION: CPT

## 2023-10-10 PROCEDURE — 97116 GAIT TRAINING THERAPY: CPT

## 2023-10-10 NOTE — PROGRESS NOTES
PHYSICAL THERAPY - DAILY TREATMENT NOTE (updated )    Patient Name: Madyson Dennis    Date: 10/10/2023    : 2000  Insurance: Payor: Michelle Reynolds / Plan: Dong [employer] / Product Type: *No Product type* /      Patient  verified yes     Visit #   Current / Total -27   Time   In / Out 5:19 5:59   Pain   In / Out 4/10 3/10   Subjective Functional Status/Changes: \"Pressure in my head and balance have been bothering me recently. \"      TREATMENT AREA =  Unsteadiness on feet [R26.81]  Concussion [S06. 0XAA]    Next PN due 10/26/23  RC NA  Auth date 2023    OBJECTIVE    Therapeutic Procedures: Tx Min Billable or 1:1 Min (if diff from Tx Min) Procedure, Rationale, Specifics   10  08817 Therapeutic Exercise (timed):  increase ROM, strength, coordination, balance, and proprioception to improve patient's ability to progress to PLOF and address remaining functional goals. (see flow sheet as applicable)     Details if applicable:       10  28524 Therapeutic Activity (timed):  use of dynamic activities replicating functional movements to increase ROM, strength, coordination, balance, and proprioception in order to improve patient's ability to progress to PLOF and address remaining functional goals. (see flow sheet as applicable)     Details if applicable:     12  35887 Neuromuscular Re-Education (timed):  improve balance, coordination, kinesthetic sense, posture, core stability and proprioception to improve patient's ability to develop conscious control of individual muscles and awareness of position of extremities in order to progress to PLOF and address remaining functional goals. (see flow sheet as applicable)     Details if applicable:     8  00746 Gait Training (timed): To improve safety and dynamic movement with household/community ambulation.   (see flow sheet as applicable)     Details if applicable:     36  [facility] Totals Reminder: bill using total billable min of

## 2023-10-11 ENCOUNTER — HOSPITAL ENCOUNTER (OUTPATIENT)
Facility: HOSPITAL | Age: 23
Setting detail: RECURRING SERIES
Discharge: HOME | End: 2023-10-14
Payer: MEDICAID

## 2023-10-11 PROCEDURE — 97110 THERAPEUTIC EXERCISES: CPT

## 2023-10-11 PROCEDURE — 97530 THERAPEUTIC ACTIVITIES: CPT

## 2023-10-16 ENCOUNTER — HOSPITAL ENCOUNTER (OUTPATIENT)
Facility: HOSPITAL | Age: 23
Setting detail: RECURRING SERIES
Discharge: HOME | End: 2023-10-19
Payer: MEDICAID

## 2023-10-16 PROCEDURE — 97110 THERAPEUTIC EXERCISES: CPT

## 2023-10-16 PROCEDURE — 97112 NEUROMUSCULAR REEDUCATION: CPT

## 2023-10-16 PROCEDURE — 97530 THERAPEUTIC ACTIVITIES: CPT

## 2023-10-16 PROCEDURE — 97129 THER IVNTJ 1ST 15 MIN: CPT

## 2023-10-16 PROCEDURE — 97130 THER IVNTJ EA ADDL 15 MIN: CPT

## 2023-10-16 NOTE — PROGRESS NOTES
ST DAILY TREATMENT NOTE    Patient Name: Maxwell Leiva  Date:10/16/2023  : 2000  [x]  Patient  Verified  Payor: Payor: Althea Lirianoer / Plan: VEGA [employer] / Product Type: *No Product type* /   In time:1126  Out time:1200  Total Treatment Time (min): 34  Visit #: 3 of 24  Recert due ; 7/82L until 23    Treatment Diagnosis: Cognitive communication deficit Temedel.Venancio ]    SUBJECTIVE  Pain Level (0-10 scale): 3    Subjective functional status/changes:   [] No changes reported  Pt reported the back pain she had back pain for at least 3 weeks but is now starting to improve. She continues to have difficulty with recall and sustained attention. She would intermittently forget what she was saying but would recall after ~5 min later. OBJECTIVE  Treatment provided includes:  Increase/Improve:  []  Voice Quality [x]  Cognitive Linguistic Skills []  Laryngeal/Pharyngeal Exercises   []  Vocal Loudness []  Reading Comprehension []  Swallowing Skills    []  Vocal Cord Function []  Auditory Comprehension []  Oral Motor Skills   []  Resonance []  Writing Skills [x]  Compensatory strategies    []  Speech Intelligibility []  Expressive Language [x]  Attention   []  Breath Support/Coord.  []  Receptive language [x]  Memory   []  Articulation []  Safety Awareness [x] Pt educ   []  Fluency []  Word Retrieval []        Treatment Provided:  Cognitive-communication therapy (initial 15 minutes) [18563]  Additional cognitive-communication [19581]:  -informal cognitive-linguistic probe  -immediate recall  -delayed recall  -problem-solving  -word problems  -reasoning    Patient/Caregiver  Education: [x] Review HEP      HEP/Handouts given: WRAP    Pain Level (0-10 scale) post treatment: 3    ASSESSMENT     []   Improving appropriately and progressing toward goals  [x]   Improving slowly and progressing toward goals  []   Approximating goals/maximum potential  [x]   Continues to benefit
independence and communication competence. 4) Pt will complete simple-moderately complex arithmetic/safety awareness/ logical problem solving  tasks with 70% acc when provided with modA cues/ reps to promote IADLs within her environment. 5) Pt will complete reasoning tasks with 70% acc give modA in order to promote safety/independence and overall communication competence. ASSESSMENT:   Pt with limited progress in skilled ST d/t limited attendance 2* insurance authorization. Pt continues to present with mod-severe cognitive-linguistic impairments c/b deficits in recall, auditory processing, and problem-solving. Pt continues to report that these deficits impact her QOL as she observed she would have difficulty recalling topics and what she wanted to say as well as impact her independence in ADLs. Pskilled OP ST continues to be medically necessary to address these deficits to improve communication for ADLs, social communication, safety, QOL, and dignity. ASSESSMENT/RECOMMENDATIONS:  [x]Continue therapy per initial plan/protocol at a frequency of  2 x per week for 4/12 weeks  []Continue therapy with the following recommended changes:_____________________      _____________________________________________________________________  []Discontinue therapy progressing towards or have reached established goals  []Discontinue therapy due to lack of appreciable progress towards goals  []Discontinue therapy due to lack of attendance or compliance  []Await Physician's recommendations/decisions regarding therapy  []Other:________________________________________________________________    Certification Period 8/15/2023 to 11/7/2023    Thank you for this referral.   Enrico Ryan M.A., Maryc[facility] Field  Speech Language Pathologist      10/16/2023     8:06 AM      Physician signature required for Medicare, Medicaid, Worker's Comp, Direct Access

## 2023-10-16 NOTE — PROGRESS NOTES
PHYSICAL THERAPY - DAILY TREATMENT NOTE (updated )    Patient Name: Herberth River    Date: 10/16/2023    : 2000  Insurance: Payor: Oleksandr Cardona / Plan: Joel [employer] / Product Type: *No Product type* /      Patient  verified yes     Visit #   Current / Total 20 19-27   Time   In / Out 10:46 11:21   Pain   In / Out 3/10 3/10   Subjective Functional Status/Changes: \"My back pain has almost disappeared. It still bothers me a little bit, but not like it did. \"     TREATMENT AREA =  Unsteadiness on feet [R26.81]  Concussion [S06. 0XAA]    Next PN due 10/26/23  RC NA  Auth date 2023 (15 visits)     OBJECTIVE    Therapeutic Procedures: Tx Min Billable or 1:1 Min (if diff from Tx Min) Procedure, Rationale, Specifics   11  93692 Therapeutic Exercise (timed):  increase ROM, strength, coordination, balance, and proprioception to improve patient's ability to progress to PLOF and address remaining functional goals. (see flow sheet as applicable)     Details if applicable:       10  54945 Therapeutic Activity (timed):  use of dynamic activities replicating functional movements to increase ROM, strength, coordination, balance, and proprioception in order to improve patient's ability to progress to PLOF and address remaining functional goals. (see flow sheet as applicable)     Details if applicable:     12  46514 Neuromuscular Re-Education (timed):  improve balance, coordination, kinesthetic sense, posture, core stability and proprioception to improve patient's ability to develop conscious control of individual muscles and awareness of position of extremities in order to progress to PLOF and address remaining functional goals.  (see flow sheet as applicable)     Details if applicable:     35  [facility] Totals Reminder: bill using total billable min of TIMED therapeutic procedures (example: do not include dry needle or estim unattended, both untimed codes, in totals to left)  8-22 min

## 2023-10-18 ENCOUNTER — APPOINTMENT (OUTPATIENT)
Facility: HOSPITAL | Age: 23
End: 2023-10-18
Payer: MEDICAID

## 2023-10-20 ENCOUNTER — HOSPITAL ENCOUNTER (OUTPATIENT)
Facility: HOSPITAL | Age: 23
Setting detail: RECURRING SERIES
Discharge: HOME | End: 2023-10-23
Payer: MEDICAID

## 2023-10-20 PROCEDURE — 97530 THERAPEUTIC ACTIVITIES: CPT

## 2023-10-20 PROCEDURE — 97116 GAIT TRAINING THERAPY: CPT

## 2023-10-20 PROCEDURE — 97110 THERAPEUTIC EXERCISES: CPT

## 2023-10-20 NOTE — PROGRESS NOTES
order to progress to PLOF and address remaining functional goals. (see flow sheet as applicable)     Details if applicable:  VOR X 1    10  76062 Gait Training (timed): To improve safety and dynamic movement with household/community ambulation. (see flow sheet as applicable)    37  [facility] Totals Reminder: bill using total billable min of TIMED therapeutic procedures (example: do not include dry needle or estim unattended, both untimed codes, in totals to left)  8-22 min = 1 unit; 23-37 min = 2 units; 38-52 min = 3 units; 53-67 min = 4 units; 68-82 min = 5 units   Total Total     [x]  Patient Education billed concurrently with other procedures   [x] Review HEP    [] Progressed/Changed HEP, detail:    [] Other detail:       Objective Information/Functional Measures/Assessment    Pt able to demo tandem gait forward/retro without LOB. Pt looks down frequently for foot placement. Frequent cueing to maintain gait speed during gait with head turns. Pt reports slight increase in HA symptoms with dynamic gait activity. Pt able to demo VOR X 1 in sitting, with min Vcing for technique. Pt able to tolerate for 30-40 seconds with  mild symptoms only. CSI 46/132    Patient will continue to benefit from skilled PT services to modify and progress therapeutic interventions, analyze and address functional mobility deficits, analyze and address strength deficits, analyze and cue for proper movement patterns, analyze and modify for postural abnormalities, analyze and address imbalance/dizziness, and instruct in home and community integration to address functional deficits and attain remaining goals. Progress toward goals / Updated goals:  []  See Progress Note/Recertification     Patient will demonstrate independence with HEP for symptom management. Status at IE NA  Current Status Compliant, progressing  2. Patient will score less than or equal to 30/132 on CSI to allow improved activity tolerance.   Status at IE

## 2023-10-24 ENCOUNTER — HOSPITAL ENCOUNTER (OUTPATIENT)
Facility: HOSPITAL | Age: 23
Setting detail: RECURRING SERIES
End: 2023-10-24
Payer: MEDICAID

## 2023-10-24 ENCOUNTER — APPOINTMENT (OUTPATIENT)
Facility: HOSPITAL | Age: 23
End: 2023-10-24
Payer: MEDICAID

## 2023-10-25 ENCOUNTER — APPOINTMENT (OUTPATIENT)
Facility: HOSPITAL | Age: 23
End: 2023-10-25
Payer: MEDICAID

## 2023-10-26 ENCOUNTER — HOSPITAL ENCOUNTER (OUTPATIENT)
Facility: HOSPITAL | Age: 23
Setting detail: RECURRING SERIES
Discharge: HOME | End: 2023-10-29
Payer: MEDICAID

## 2023-10-26 PROCEDURE — 97530 THERAPEUTIC ACTIVITIES: CPT

## 2023-10-26 PROCEDURE — 97129 THER IVNTJ 1ST 15 MIN: CPT

## 2023-10-26 PROCEDURE — 97130 THER IVNTJ EA ADDL 15 MIN: CPT

## 2023-10-26 PROCEDURE — 97112 NEUROMUSCULAR REEDUCATION: CPT

## 2023-10-26 NOTE — PROGRESS NOTES
Other:    Terrell Arreola.  Osei Saldaña M.A., [address] [facility]  Speech Language Pathologist   10/26/2023  7:32 AM    Future Appointments   Date Time Provider [address]   10/26/2023 10:40 AM [facility]   10/26/2023 11:20 AM [facility]   10/31/2023 11:20 AM [facility]   10/31/2023 12:00 PM [facility]   11/2/2023 10:00 AM [facility]   11/2/2023 10:40 AM [facility]   11/6/2023  1:30 PM Nilton Rinaldi DO VOS[facility] BS AMB   1/9/2024  1:15 PM MD MICHAEL NicholeS BS AMB   1/12/2024 10:45 AM Verdell Kanner A, DO [facility] BS AMB   8/19/2024 11:40 AM Prakash Crow PA-C [facility] Whyte

## 2023-10-26 NOTE — PROGRESS NOTES
[address] [facility] [facility]  [address] [address]. [address]   Phone: [phone] Fax: [phone]  PROGRESS NOTE  Patient Name: Chris Lundberg : 2000   Treatment/Medical Diagnosis:  Unsteadiness on feet [R26.81]  Concussion [S06. 0XAA]   Referral Source: Angelita Correa DO     Date of Initial Visit: 23 Attended Visits: 22 Missed Visits: 1     SUMMARY OF TREATMENT  Treatment has consisted of ROM/stretching/strengthening exercises, oculomotor and vestibular habituation exercises, balance training, functional mobility/gait training, modalities for pain relief and patient education (including HEP). CURRENT STATUS     Patient will demonstrate independence with HEP for symptom management. Current Status Pt I with HEP GOAL MET  2. Patient will score less than or equal to 30/132 on CSI to allow improved activity tolerance. Current Status 57/132 (ranging from 45-80/132 the past month)   3. Patient will demonstrate total scores < 10 on VOMS to allow improved activity tolerance. Current Status >4 on 4/8  (8/8 > 4 at last reassessment) GOAL NOT MET   4. Patient will maintain sharpened Romberg eyes open 30\"B to increase safety in challenging environments. Current Status 30\" B (with multiple attempts) GOAL MET    Pt continues to demo slow progress with concussion symptoms with ADL's. Pt's Concussion Symptom Inventory (CSI) has fluctuated between 45-80/132 in the past month. Pt reports neck pain improving with fluctuating pain level from day to day that seems to coincide with how well she sleeps the night before. Pt reports lower back pain decreasing in frequency but still intermittent LBP. Pt reports decreaed frequency of HA's and dizziness with ADL's. Pt continues to report random LOB with ADL's that more often happen when she is busy or rushing. Pt with improved tolerance to VOMS test this session.  Pt previous scored >10 on 8/8 categories and
address remaining functional goals.  (see flow sheet as applicable)     Details if applicable:     27  [facility] Totals Reminder: bill using total billable min of TIMED therapeutic procedures (example: do not include dry needle or estim unattended, both untimed codes, in totals to left)  8-22 min = 1 unit; 23-37 min = 2 units; 38-52 min = 3 units; 53-67 min = 4 units; 68-82 min = 5 units   Total Total     [x]  Patient Education billed concurrently with other procedures   [x] Review HEP    [] Progressed/Changed HEP, detail:    [] Other detail:       Objective Information/Functional Measures/Assessment      Vestibular Ocular Motor Test   Not Tested Headache  (0-10) Dizzines  (0-10) Nausea  (0-10) Fogginess  (0-10) Totals Comments   Baseline Symptoms (0-10 scale)      3 2 2 2 9    Smooth Pursuits (2 reps each horizontal and vertical)  3 2 2 2 9    Saccades - Horizontal (10 reps)  3 3 2 2 10    Saccades - Vertical (10 reps)  3 3 2 2 10    Convergence (near point) (3 reps)  3 4 2 2 11 Measure 1: 5 cm  Measure 2: 6 cm  Measure 3: 6 cm  Average: 5.6 cm   VOR - Horizontal (10 reps) - seated with metronome at 180 beats/min; focus on 14 point font A  3 4 2 3 12    VOR - Vertical (10 reps) - seated with metronome at 180 beats/min, focus on 14 point font A  4 4 2 3 13    Visual Motion Sensitivity/ VOR Cancellation (5 reps) - standing with metronome at 50 beats/min; focus on thumb  4 5 2 4 15    *Totals > 2 and NPC > 5 cm represent clinically significant cut offs    CSI: 57/132    EO SR 30\" right  LE/ 20\", 27, 30\" left LE  EC SR right 30\"/20\" left LE     Patient will continue to benefit from skilled PT services to modify and progress therapeutic interventions, analyze and address functional mobility deficits, analyze and address strength deficits, analyze and cue for proper movement patterns, analyze and modify for postural abnormalities, analyze and address imbalance/dizziness, and instruct in home and community integration to

## 2023-10-27 ENCOUNTER — APPOINTMENT (OUTPATIENT)
Facility: HOSPITAL | Age: 23
End: 2023-10-27
Payer: MEDICAID

## 2023-10-31 ENCOUNTER — HOSPITAL ENCOUNTER (OUTPATIENT)
Facility: HOSPITAL | Age: 23
Setting detail: RECURRING SERIES
End: 2023-10-31
Payer: MEDICAID

## 2023-10-31 ENCOUNTER — APPOINTMENT (OUTPATIENT)
Facility: HOSPITAL | Age: 23
End: 2023-10-31
Payer: MEDICAID

## 2023-11-02 ENCOUNTER — HOSPITAL ENCOUNTER (OUTPATIENT)
Facility: HOSPITAL | Age: 23
Setting detail: RECURRING SERIES
Discharge: HOME | End: 2023-11-05
Payer: MEDICAID

## 2023-11-02 PROCEDURE — 97110 THERAPEUTIC EXERCISES: CPT

## 2023-11-02 PROCEDURE — 97129 THER IVNTJ 1ST 15 MIN: CPT

## 2023-11-02 PROCEDURE — 97112 NEUROMUSCULAR REEDUCATION: CPT

## 2023-11-02 PROCEDURE — 97116 GAIT TRAINING THERAPY: CPT

## 2023-11-02 PROCEDURE — 97130 THER IVNTJ EA ADDL 15 MIN: CPT

## 2023-11-02 NOTE — PROGRESS NOTES
goals:  1) Pt will recall at least 3 recall strategies (e.g., WRAP, keywords, associations, therapy notebook, and chunking/grouping) and use them appropriately in 70% of opportunities given with modA to increase short-term recall to increase safety in the functional living environment. Current: Pt utilized picturing to recall 5/5 spatial markers around therapy gym LG after a 5-min delay x2 and given increased response time. -progressing/continue addressing. 2) Pt will increasing auditory processing by recall information re: 2-3 sentence paragraphs/instructions/novel information presented orally with 70% acc given modA. Current: Not targeted this date. 3) Pt will immediately recall a list of at least 5 items (e.g., medication list, to-do list, novel information, etc)  with 70% acc given modA and use of comp strategies in various structured tasks to improve safety, independence and communication competence. Current: Immediately recalled 5/5 spatial markers around therapy gym with 100% acc LG with use of picturing. -progressing/continue addressing. 4) Pt will complete simple-moderately complex arithmetic/safety awareness/ logical problem solving  tasks with 70% acc when provided with modA cues/ reps to promote IADLs within her environment. Current:  Pt accurately answered simple problem solving questions involving time management with 75% acc given min verbal cues. -progressing/continue addressing. 5) Pt will complete reasoning tasks with 70% acc give modA in order to promote safety/independence and overall communication competence. Current: Pt completed word deductions with 83% acc given mod verbal cues. -progressing/continue addressing. PLAN  [x]  Continue plan of care  []  Modify Goals/Treatment Plan      []  Discharge due to:  [] Other:    Nini Driscoll.  Daniel Novak M.A., [facility]  Speech Language Pathologist   11/2/2023  8:39 AM    Future Appointments   Date Time Provider Department

## 2023-11-02 NOTE — PROGRESS NOTES
PHYSICAL / OCCUPATIONAL THERAPY - DAILY TREATMENT NOTE (updated )    Patient Name: Sydney Lambert    Date: 2023    : 2000  Insurance: Payor: Abhay Iyer / Plan: Bakari [employer] / Product Type: *No Product type* /      Patient  verified yes     Visit #   Current / Total 23 19-27   Time   In / Out 10:04 10:40   Pain   In / Out 3 2   Subjective Functional Status/Changes: Pt reports her sleeping has been off recently, so she knows she needs to get that under control. Is having more neck pain and difficulty getting good sleep because she is staying on a friends couch right now. Next PN due 23  RC NA  Auth date 2023 (15 visits)     TREATMENT AREA =  Unsteadiness on feet [R26.81]  Concussion [S06. 0XAA]    OBJECTIVE    Therapeutic Procedures: Tx Min Billable or 1:1 Min (if diff from Tx Min) Procedure, Rationale, Specifics   14  22005 Therapeutic Exercise (timed):  increase ROM, strength, coordination, balance, and proprioception to improve patient's ability to progress to PLOF and address remaining functional goals. (see flow sheet as applicable)     Details if applicable:       10  46274 Gait Training (timed): To improve safety and dynamic movement with household/community ambulation. (see flow sheet as applicable)    12  63018 Neuromuscular Re-Education (timed):  improve balance, coordination, kinesthetic sense, posture, core stability and proprioception to improve patient's ability to develop conscious control of individual muscles and awareness of position of extremities in order to progress to PLOF and address remaining functional goals.  (see flow sheet as applicable)     Details if applicable:     39  [facility] Totals Reminder: bill using total billable min of TIMED therapeutic procedures (example: do not include dry needle or estim unattended, both untimed codes, in totals to left)  8-22 min = 1 unit; 23-37 min = 2 units; 38-52 min = 3 units; 53-67 min

## 2023-11-06 ENCOUNTER — OFFICE VISIT (OUTPATIENT)
Age: 23
End: 2023-11-06
Payer: MEDICAID

## 2023-11-06 VITALS — HEIGHT: 65 IN | BODY MASS INDEX: 18.16 KG/M2 | WEIGHT: 109 LBS | RESPIRATION RATE: 14 BRPM

## 2023-11-06 DIAGNOSIS — M99.02 THORACIC REGION SOMATIC DYSFUNCTION: ICD-10-CM

## 2023-11-06 DIAGNOSIS — M99.04 SACRAL REGION SOMATIC DYSFUNCTION: ICD-10-CM

## 2023-11-06 DIAGNOSIS — M99.03 LUMBAR REGION SOMATIC DYSFUNCTION: ICD-10-CM
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
## 2023-11-06 DIAGNOSIS — M99.09 SOMATIC DYSFUNCTION OF ABDOMINAL REGION: ICD-10-CM

## 2023-11-06 DIAGNOSIS — M99.07 UPPER EXTREMITY SOMATIC DYSFUNCTION: ICD-10-CM

## 2023-11-06 DIAGNOSIS — M99.05 PELVIC SOMATIC DYSFUNCTION: ICD-10-CM

## 2023-11-06 DIAGNOSIS — S06.0XAD CONCUSSION WITH LOSS OF CONSCIOUSNESS STATUS UNKNOWN, SUBSEQUENT ENCOUNTER: Primary | ICD-10-CM

## 2023-11-06 DIAGNOSIS — M99.08 RIB CAGE REGION SOMATIC DYSFUNCTION: ICD-10-CM

## 2023-11-06 DIAGNOSIS — M99.01 CERVICAL SOMATIC DYSFUNCTION: ICD-10-CM

## 2023-11-06 DIAGNOSIS — M99.06 LOWER LIMB REGION SOMATIC DYSFUNCTION: ICD-10-CM

## 2023-11-06 PROCEDURE — 99213 OFFICE O/P EST LOW 20 MIN: CPT | Performed by: FAMILY MEDICINE

## 2023-11-06 PROCEDURE — 98929 OSTEOPATH MANJ 9-10 REGIONS: CPT | Performed by: FAMILY MEDICINE

## 2023-11-09 ENCOUNTER — HOSPITAL ENCOUNTER (OUTPATIENT)
Facility: HOSPITAL | Age: 23
Setting detail: RECURRING SERIES
Discharge: HOME | End: 2023-11-12
Payer: MEDICAID

## 2023-11-09 PROCEDURE — 97129 THER IVNTJ 1ST 15 MIN: CPT

## 2023-11-09 PROCEDURE — 97530 THERAPEUTIC ACTIVITIES: CPT

## 2023-11-09 PROCEDURE — 97130 THER IVNTJ EA ADDL 15 MIN: CPT

## 2023-11-09 PROCEDURE — 97112 NEUROMUSCULAR REEDUCATION: CPT

## 2023-11-09 PROCEDURE — 97110 THERAPEUTIC EXERCISES: CPT

## 2023-11-09 NOTE — PROGRESS NOTES
and address remaining functional goals. (see flow sheet as applicable)     Details if applicable:   TA/GT per flow sheet             45  [facility] Totals Reminder: bill using total billable min of TIMED therapeutic procedures (example: do not include dry needle or estim unattended, both untimed codes, in totals to left)  8-22 min = 1 unit; 23-37 min = 2 units; 38-52 min = 3 units; 53-67 min = 4 units; 68-82 min = 5 units   Total Total     [x]  Patient Education billed concurrently with other procedures   [x] Review HEP    [] Progressed/Changed HEP, detail:    [] Other detail:       Objective Information/Functional Measures/Assessment    Exercises per flow sheet  Tolerated VOR x 1 seated 1' each direction with mild dizziness  Reported mild dizziness with SR EC    Patient will continue to benefit from skilled PT / OT services to modify and progress therapeutic interventions, analyze and address functional mobility deficits, analyze and address ROM deficits, analyze and address strength deficits, analyze and address soft tissue restrictions, analyze and cue for proper movement patterns, analyze and modify for postural abnormalities, analyze and address imbalance/dizziness, and instruct in home and community integration to address functional deficits and attain remaining goals. Progress toward goals / Updated goals:  []  See Progress Note/Recertification    Progressing toward goals:  1. Patient will score less than or equal to 30/132 on CSI to allow improved activity tolerance. Current Status 56/132   2. Patient will demonstrate total scores < 10 on VOMS to allow improved activity tolerance. Current Status <10 on 2/8 categories GOAL NOT MET   3. Patient will demonstrate VOR X 1 for 1 minute with < mild symptoms only to indicate increased activity tolerance. Current:  Tolerated 1' each direction with mild dizziness, progressing    PLAN  Yes  Continue plan of care  [x]  Upgrade activities as tolerated  []  Discharge

## 2023-11-09 NOTE — PROGRESS NOTES
[facility]  [address] [facility], [address] [place] [address], [place],  Enrique Wilson YF:[phone] Fx: [phone]    Speech Therapy Physician Update    Reporting Period 10/16/23 to 23    [x] Progress Note  [] Discharge Summary    Patient Name: Deonte Snell : 2000   Treatment/Medical   Diagnosis: Unsteadiness on feet [R26.81]  Concussion [S06. 0XAA]   Onset Date: 23     Referral Source: Casi Landry Start of Care [facility]): 8/15/2023   Prior Hospitalization: See medical history Provider #: [id number]   Prior Level of Function: All aspects of speech/language, cognition, voice, and swallowing WNLs   Comorbidities: Headache/migraine, neck/back pain, depression, anxiety, IBS, interstitial cystitis, sleep dysfunction   Medications: Verified on Patient Summary List   Visits from Start of Care: 8    Missed Visits: 2    Status at Evaluation/Last Progress Note:   Impression (8/15/23 eval): Pt demonstrates with mod-severe cognitive-linguistic impairment c/b deficits in STM, LTM, delayed recall, problem-solving, and auditory processing. Pt requires increased time to respond to questions and often would answer \"I don't know\" during tasks, which is indicative of a processing deficit. Due to the processing deficits, her STM and LTM are impacted as she requires longer time to process information which she may not be able to recall. Pt would benefit from skilled OP ST to address these deficits to improve communication for ADLs, social communication, safety, QOL, and dignity. 10/16/2023: An informal cognitive-communication evaluation was completed to probe pt's progress in her goals . Pt was probed in basic orientation, LTM recall, STM/immediate recall, delayed recall, paragraph recall, attention, categorization, similarities and differences, sequencing, basic safety, mathematical reasoning, and medication label comprehension.  Pt required increased response time during tasks and
functional deficits  []   Not progressing toward goals and plan of care will be adjusted      Progress towards goals / Updated goals:  1) Pt will recall at least 3 recall strategies (e.g., WRAP, keywords, associations, therapy notebook, and chunking/grouping) and use them appropriately in 70% of opportunities given with modA to increase short-term recall to increase safety in the functional living environment. Current: Pt able to recall 2/3 items during RIPA-2 reassessment after ~5 min delay. Pt utilized association (e.g., it's something you wear) to increase recall, though unable to recall last item. Pt with \"marked\" deficits in LTM and delayed recall per RIPA-2 reassessment. -progresing/continue addressing      2) Pt will increasing auditory processing by recall information re: 2-3 sentence paragraphs/instructions/novel information presented orally with 70% acc given modA. Current: Pt with \"marked\" deficits per RIPA-2 reassessment. -progresing/continue addressing      3) Pt will immediately recall a list of at least 5 items (e.g., medication list, to-do list, novel information, etc)  with 70% acc given modA and use of comp strategies in various structured tasks to improve safety, independence and communication competence. Current: Pt with \"marked\" deficits per RIPA-2 reassessment. Pt able to recall 3-4 digits, however pt demo increased inaccuracy with recall of information with increased length and complexity. -progresing/continue addressing      4) Pt will complete simple-moderately complex arithmetic/safety awareness/ logical problem solving  tasks with 70% acc when provided with modA cues/ reps to promote IADLs within her environment. Current:  Not targeted this date     11) Pt will complete reasoning tasks with 70% acc give modA in order to promote safety/independence and overall communication competence. Current: Not targeted this date.        SLP re-administered portions of the [employer]

## 2023-11-14 ENCOUNTER — HOSPITAL ENCOUNTER (OUTPATIENT)
Facility: HOSPITAL | Age: 23
Setting detail: RECURRING SERIES
Discharge: HOME | End: 2023-11-17
Payer: MEDICAID

## 2023-11-14 PROCEDURE — 97535 SELF CARE MNGMENT TRAINING: CPT

## 2023-11-14 PROCEDURE — 97112 NEUROMUSCULAR REEDUCATION: CPT

## 2023-11-14 PROCEDURE — 97130 THER IVNTJ EA ADDL 15 MIN: CPT

## 2023-11-14 PROCEDURE — 97110 THERAPEUTIC EXERCISES: CPT

## 2023-11-14 PROCEDURE — 97530 THERAPEUTIC ACTIVITIES: CPT

## 2023-11-14 PROCEDURE — 97129 THER IVNTJ 1ST 15 MIN: CPT

## 2023-11-14 NOTE — PROGRESS NOTES
ST DAILY TREATMENT NOTE    Patient Name: Sydney Lambert  Date:2023  : 2000  [x]  Patient  Verified  Payor: Payor: Abhay Iyer / Plan: [employer]GA [employer]US / Product Type: *No Product type* /   In time:1402  Out time:1442  Total Treatment Time (min): 40  Visit #: 2 of 8  PN due 2023; 7/15V until 23    Treatment Diagnosis: Cognitive communication deficit Evolet.Pinch ]    SUBJECTIVE  Pain Level (0-10 scale): 2    Subjective functional status/changes:   [] No changes reported  Pt reported that her sore throat cleared up after drinking water, taking medication for sore throat and congestion. Pt reported she is planning on going back to school to pursue an art degree. OBJECTIVE  Treatment provided includes:  Increase/Improve:  []  Voice Quality [x]  Cognitive Linguistic Skills []  Laryngeal/Pharyngeal Exercises   []  Vocal Loudness []  Reading Comprehension []  Swallowing Skills    []  Vocal Cord Function []  Auditory Comprehension []  Oral Motor Skills   []  Resonance []  Writing Skills [x]  Compensatory strategies    []  Speech Intelligibility []  Expressive Language []  Attention   []  Breath Support/Coord. []  Receptive language [x]  Memory   []  Articulation []  Safety Awareness [x] Pt educ   []  Fluency []  Word Retrieval []        Treatment Provided:  Cognitive-communication therapy (initial 15 minutes) [24083]  Additional cognitive-communication [56547]:  -pt educ  -keywords  -immediate recall  -auditory comprehension    Patient/Caregiver  Education: [x] Review HEP      HEP/Handouts given: Inferential reasoning task worksheet.      Pain Level (0-10 scale) post treatment: 0    ASSESSMENT      [x]   Improving appropriately and progressing toward goals  []   Improving slowly and progressing toward goals  []   Approximating goals/maximum potential  [x]   Continues to benefit from skilled therapy to address remaining functional deficits  []   Not progressing toward

## 2023-11-14 NOTE — PROGRESS NOTES
PHYSICAL THERAPY - DAILY TREATMENT NOTE (updated )    Patient Name: Roxanna Dunham    Date: 2023    : 2000  Insurance: Payor: Zaheer Bates / Plan: [facility] [employer]ALTHKEEPERS PLUS / Product Type: *No Product type* /      Patient  verified yes     Visit #   Current / Total 25 19-27   Time   In / Out 2:43 3:20   Pain   In / Out 2/10 1+/10   Subjective Functional Status/Changes: \"I am not hurting that much today. I am feeling pretty good. I have a headache intermittently. The headaches always start with a pressure at the base of my head now. \" Reports VOR x 1 causes mild dizziness and aggravates a headache if it was present prior to exercise but does not provoke a headache. TREATMENT AREA =  Unsteadiness on feet [R26.81]  Concussion [S06. 0XAA]    Next PN/ RC due 2023  Auth due 15 visits, exp 2023 (9/15 this session)    OBJECTIVE    Therapeutic Procedures: Tx Min Billable or 1:1 Min (if diff from Tx Min) Procedure, Rationale, Specifics   8  04402 Therapeutic Exercise (timed):  increase ROM, strength, coordination, balance, and proprioception to improve patient's ability to progress to PLOF and address remaining functional goals. (see flow sheet as applicable)     Details if applicable:       9  23806 Therapeutic Activity (timed):  use of dynamic activities replicating functional movements to increase ROM, strength, coordination, balance, and proprioception in order to improve patient's ability to progress to PLOF and address remaining functional goals. (see flow sheet as applicable)     Details if applicable:     12  27644 Neuromuscular Re-Education (timed):  improve balance, coordination, kinesthetic sense, posture, core stability and proprioception to improve patient's ability to develop conscious control of individual muscles and awareness of position of extremities in order to progress to PLOF and address remaining functional goals.  (see flow sheet as applicable)

## 2023-11-16 ENCOUNTER — HOSPITAL ENCOUNTER (OUTPATIENT)
Facility: HOSPITAL | Age: 23
Setting detail: RECURRING SERIES
Discharge: HOME | End: 2023-11-19
Payer: MEDICAID

## 2023-11-16 PROCEDURE — 97130 THER IVNTJ EA ADDL 15 MIN: CPT

## 2023-11-16 PROCEDURE — 97129 THER IVNTJ 1ST 15 MIN: CPT

## 2023-11-16 NOTE — PROGRESS NOTES
Discharge due to:  [] Other:    Corin Beltran.  JESE De La Cruz, [address] [facility]  Speech Language Pathologist   11/16/2023  8:45 AM    Future Appointments   Date Time Provider [address]   11/16/2023  [facility]   11/20/2023  1:20 PM Jewell Moreno, PT [facility]   11/20/2023  2:00 [facility]   11/21/2023  1:20 PM Jewell Moreno, PT [facility]   11/21/2023  2:00 [facility]   11/27/2023  1:20 PM Peg Hansen [facility]   11/27/2023  2:00 PM [facility]   11/30/2023 12:40 PM Jose Daniel Perla, JAYANT [facility]   11/30/2023  1:20 PM [facility] [place]   12/4/2023  1:45 PM Jacqueline Gibson DO VOTE BS AMB   1/9/2024  1:15 PM Jeremie Neves MD VGS BS AMB   1/12/2024 10:45 AM DO FIDE Khalil BS AMB   8/19/2024 11:40 AM Alfonso Moody PA-C [facility] Lorrie Booker

## 2023-11-20 ENCOUNTER — HOSPITAL ENCOUNTER (OUTPATIENT)
Facility: HOSPITAL | Age: 23
Setting detail: RECURRING SERIES
Discharge: HOME | End: 2023-11-23
Payer: MEDICAID

## 2023-11-20 PROCEDURE — 97116 GAIT TRAINING THERAPY: CPT

## 2023-11-20 PROCEDURE — 97130 THER IVNTJ EA ADDL 15 MIN: CPT

## 2023-11-20 PROCEDURE — 97530 THERAPEUTIC ACTIVITIES: CPT

## 2023-11-20 PROCEDURE — 97129 THER IVNTJ 1ST 15 MIN: CPT

## 2023-11-20 NOTE — PROGRESS NOTES
billed concurrently with other procedures   [x] Review HEP    [] Progressed/Changed HEP, detail:    [] Other detail:       Objective Information/Functional Measures/Assessment    Exercises per flowsheet. CSI: 43/132    Patient will continue to benefit from skilled PT / OT services to modify and progress therapeutic interventions, analyze and address functional mobility deficits, analyze and address ROM deficits, analyze and address strength deficits, analyze and address soft tissue restrictions, analyze and cue for proper movement patterns, analyze and modify for postural abnormalities, analyze and address imbalance/dizziness, and instruct in home and community integration to address functional deficits and attain remaining goals. Progress toward goals / Updated goals:  []  See Progress Note/Recertification    1. Patient will score less than or equal to 30/132 on CSI to allow improved activity tolerance. Current Status 43/132   2. Patient will demonstrate total scores < 10 on VOMS to allow improved activity tolerance. Current Status: NT  3. Patient will demonstrate VOR X 1 for 1 minute with < mild symptoms only to indicate increased activity tolerance.    Current status: NT      PLAN  yes Continue plan of care  [x]  Upgrade activities as tolerated  []  Discharge due to :  []  Other:    Mily Carson, PT    11/20/2023    1:44 PM    Future Appointments   Date Time Provider [address]   11/20/2023  2:00 PM Ivanna [place]elongo, [place] [facility]   11/21/2023  1:20 PM Mily Carson, PT [facility]   11/21/2023  2:00 PM Ivanna Montelongo, [place] [facility]   11/27/2023  1:20 PM Mo Morrissey [facility]   11/27/2023  2:00 PM Ivanna Heys, [place] [facility]   11/30/2023 12:40 PM Yousif Saldaña PT [facility]   11/30/2023  1:20 PM Ivanna Montelongo, [place] [facility]   12/4/2023  1:45 PM DO ELIUD Hartley BS AMB   1/9/2024  1:15 PM MD NEO Flowers BS AMB

## 2023-11-20 NOTE — PROGRESS NOTES
ST DAILY TREATMENT NOTE    Patient Name: Madyson Dennis  Date:2023  : 2000  [x]  Patient  Verified  Payor: Payor: Michelle Reynolds / Plan: [employer]KEEPERS PLUS / Product Type: *No Product type* /   In time:1400  Out time:1440  Total Treatment Time (min): 40  Visit #: 4 of 8  PN due 2023; 9/15V until 23    Treatment Diagnosis: Cognitive communication deficit Renegade.Ramiro ]    SUBJECTIVE  Pain Level (0-10 scale): 3    Subjective functional status/changes:   [x] No changes reported  Pt reported that she completed to HEP however, forgot her homework at home. OBJECTIVE  Treatment provided includes:  Increase/Improve:  []  Voice Quality [x]  Cognitive Linguistic Skills []  Laryngeal/Pharyngeal Exercises   []  Vocal Loudness []  Reading Comprehension []  Swallowing Skills    []  Vocal Cord Function []  Auditory Comprehension []  Oral Motor Skills   []  Resonance []  Writing Skills [x]  Compensatory strategies    []  Speech Intelligibility []  Expressive Language []  Attention   []  Breath Support/Coord.  []  Receptive language [x]  Memory   []  Articulation []  Safety Awareness [x] Pt educ   []  Fluency []  Word Retrieval [x] Problem solving       Treatment Provided:  Cognitive-communication therapy (initial 15 minutes) [71223]  Additional cognitive-communication [43932]:  -delayed recall  -immediate recall  -memory comp strategies  -Percentages math problem solving    Patient/Caregiver  Education: [x] Review HEP      HEP/Handouts given: Percentages math problem solving    Pain Level (0-10 scale) post treatment: 3    ASSESSMENT     [x]   Improving appropriately and progressing toward goals  []   Improving slowly and progressing toward goals  []   Approximating goals/maximum potential  [x]   Continues to benefit from skilled therapy to address remaining functional deficits  []   Not progressing toward goals and plan of care will be adjusted      Progress towards goals / Updated [Dear  ___] : Dear  [unfilled], [Consult Letter:] : I had the pleasure of evaluating your patient, [unfilled]. [Please see my note below.] : Please see my note below. [Consult Closing:] : Thank you very much for allowing me to participate in the care of this patient.  If you have any questions, please do not hesitate to contact me. [Sincerely,] : Sincerely, [FreeTextEntry3] : Carlos Chung MD\par Pediatric Pulmonology and Sleep Medicine\par Director Pediatric [facility]\par , Pediatric Sleep Disorders,\par  of Pediatrics, [facility],\par [address] [place] [address]\[place] [address]\par (P)[phone]\par (P) [phone]\par (F) [phone] \par \par

## 2023-11-21 ENCOUNTER — HOSPITAL ENCOUNTER (OUTPATIENT)
Facility: HOSPITAL | Age: 23
Setting detail: RECURRING SERIES
Discharge: HOME | End: 2023-11-24
Payer: MEDICAID

## 2023-11-21 PROCEDURE — 97129 THER IVNTJ 1ST 15 MIN: CPT

## 2023-11-21 PROCEDURE — 97130 THER IVNTJ EA ADDL 15 MIN: CPT

## 2023-11-21 PROCEDURE — 97530 THERAPEUTIC ACTIVITIES: CPT

## 2023-11-21 PROCEDURE — 97110 THERAPEUTIC EXERCISES: CPT

## 2023-11-21 PROCEDURE — 97112 NEUROMUSCULAR REEDUCATION: CPT

## 2023-11-21 NOTE — PROGRESS NOTES
ST DAILY TREATMENT NOTE    Patient Name: Katy Acosta  Date:2023  : 2000  [x]  Patient  Verified  Payor: Payor: Frederic Self / Plan: [facility] [employer]KEEPERS PLUS / Product Type: *No Product type* /   In time:1400  Out time:1440  Total Treatment Time (min): 52  Visit #: 5 of 8  PN due 2023; 10/15V until 23    Treatment Diagnosis: Cognitive communication deficit Reggie.Glaze ]    SUBJECTIVE  Pain Level (0-10 scale): 3      Subjective functional status/changes:   [] No changes reported  Pt reported compliance to HEP however, forgot her folder in a friend's car. She confirmed her friend will be able to give her folder back sooner. OBJECTIVE  Treatment provided includes:  Increase/Improve:  []  Voice Quality [x]  Cognitive Linguistic Skills []  Laryngeal/Pharyngeal Exercises   []  Vocal Loudness []  Reading Comprehension []  Swallowing Skills    []  Vocal Cord Function []  Auditory Comprehension []  Oral Motor Skills   []  Resonance []  Writing Skills [x]  Compensatory strategies    []  Speech Intelligibility []  Expressive Language []  Attention   []  Breath Support/Coord.  []  Receptive language [x]  Memory   []  Articulation []  Safety Awareness [x] Pt educ   []  Fluency []  Word Retrieval []        Treatment Provided:  Cognitive-communication therapy (initial 15 minutes) [58484]  Additional cognitive-communication [60398]:  -immediate recall  -reasoning  -auditory comprehension     Patient/Caregiver  Education: [x] Review HEP      HEP/Handouts given: Paragraph paraphrasing, Schedule inferencing worksheet    Pain Level (0-10 scale) post treatment: 3    ASSESSMENT     [x]   Improving appropriately and progressing toward goals  []   Improving slowly and progressing toward goals  []   Approximating goals/maximum potential  [x]   Continues to benefit from skilled therapy to address remaining functional deficits  []   Not progressing toward goals and plan of care will be

## 2023-11-21 NOTE — PROGRESS NOTES
[address] [facility],[address] [facility]  [address]   Phone: [phone] Fax: [phone]  PROGRESS NOTE  Patient Name: Kailash Mcdaniel : 2000   Treatment/Medical Diagnosis: Unsteadiness on feet [R26.81]  Concussion [S06. 0XAA]   Referral Source: Loyda Patterson DO     Date of Initial Visit: 2023 Attended Visits: 27 Missed Visits: 2     SUMMARY OF TREATMENT  Treatment has consisted of ROM/stretching/strengthening exercises, oculomotor and vestibular habituation exercises, balance training, manual therapy to cervical musculature, functional mobility/gait training, modalities for pain relief and patient education (including HEP). CURRENT STATUS  Patient continues with slow progress with physical therapy. Her Concussion Symptom Inventory (CSI) score has steadily decreased since last progress note (from 56/132 to 40/132), indicating improving symptoms. She is now able to tolerate the VOR x 1 for 1 minute in a standing position with reports of mild symptoms. She continues to have totals > 10 on the VOMS, this has regressed mildly from last progress note.       CSI: 40/132     EO SR: 30\" B  EC SR: 30\" left, 8\" and 12\" right     Vestibular Ocular Motor Test          Not Tested Headache  (0-10) Dizzines  (0-10) Nausea  (0-10) Fogginess  (0-10) Totals Comments   Baseline Symptoms (0-10 scale)         3 2 1 4 10     Smooth Pursuits (2 reps each horizontal and vertical)   3 3 1 4 11     Saccades - Horizontal (10 reps)   3 4 1 4 12     Saccades - Vertical (10 reps)   3 4 1 4 12     Convergence (near point) (3 reps)   4 3 1 4 12 Measure 1: 3 cm  Measure 2: 5 cm  Measure 3: 3 cm  Average: 3.67 cm   VOR - Horizontal (10 reps) - seated with metronome at 180 beats/min; focus on 14 point font A   4 4 1 4 13     VOR - Vertical (10 reps) - seated with metronome at 180 beats/min, focus on 14 point font A   4 5 1 4 14     Visual Motion Sensitivity/ VOR
than or equal to 30/132 on CSI to allow improved activity tolerance. Current Status 40/132     Progressing toward goal  2. Patient will demonstrate total scores < 10 on VOMS to allow improved activity tolerance. Current Status totals range from 10-14   GOAL NOT MET   3. Patient will demonstrate VOR X 1 for 1 minute with < mild symptoms only to indicate increased activity tolerance.   Current status: tolerating VOR x 1 in stance for 1 minute with mild symptoms  GOAL MET      PLAN  yes Continue plan of care  [x]  Upgrade activities as tolerated  []  Discharge due to :  []  Other:    Tariq Wilkins, PT    11/21/2023    1:31 PM    Future Appointments   Date Time Provider [address]   11/21/2023  2:00 PM [facility]   11/27/2023  1:20 PM Jannet Busch [facility]   11/27/2023  2:00 PM [facility]   11/30/2023 12:40 PM Nelda Ha PT [facility]   11/30/2023  1:20 PM [facility]   12/4/2023  1:45 PM Angelita Correa DO [facility] BS AMB   1/9/2024  1:15 PM Marilee Miranda MD VGS BS AMB   1/12/2024 10:45 AM Andrew Howell DO VSGS BS AMB   8/19/2024 11:40 AM Bashir Bonilla PA-C [facility]

## 2023-11-27 ENCOUNTER — HOSPITAL ENCOUNTER (OUTPATIENT)
Facility: HOSPITAL | Age: 23
Setting detail: RECURRING SERIES
Discharge: HOME | End: 2023-11-30
Payer: MEDICAID

## 2023-11-27 PROCEDURE — 97112 NEUROMUSCULAR REEDUCATION: CPT

## 2023-11-27 PROCEDURE — 97110 THERAPEUTIC EXERCISES: CPT

## 2023-11-27 PROCEDURE — 97129 THER IVNTJ 1ST 15 MIN: CPT

## 2023-11-27 PROCEDURE — 97130 THER IVNTJ EA ADDL 15 MIN: CPT

## 2023-11-27 PROCEDURE — 97530 THERAPEUTIC ACTIVITIES: CPT

## 2023-11-27 NOTE — PROGRESS NOTES
ST DAILY TREATMENT NOTE    Patient Name: Madyson Dennis  Date:2023  : 2000  [x]  Patient  Verified  Payor: Payor: Michelle Reynolds / Plan: [employer]US / Product Type: *No Product type* /   In time:1405  Out time:1440  Total Treatment Time (min): 35  Visit #: 6 of 8  PN due 2023; 11/15V until 23    Treatment Diagnosis: Cognitive communication deficit [R41.841]    SUBJECTIVE  Pain Level (0-10 scale): 0      Subjective functional status/changes:   [] No changes reported  Pt reported she fell over during Thanksgiving the celebrations, however, denied hitting her head. Pt did not bring HEP during this session. OBJECTIVE  Treatment provided includes:  Increase/Improve:  []  Voice Quality [x]  Cognitive Linguistic Skills []  Laryngeal/Pharyngeal Exercises   []  Vocal Loudness []  Reading Comprehension []  Swallowing Skills    []  Vocal Cord Function []  Auditory Comprehension []  Oral Motor Skills   []  Resonance []  Writing Skills [x]  Compensatory strategies    []  Speech Intelligibility []  Expressive Language []  Attention   []  Breath Support/Coord.  []  Receptive language [x]  Memory   []  Articulation []  Safety Awareness [x] Pt educ   []  Fluency []  Word Retrieval []        Treatment Provided:  Cognitive-communication therapy (initial 15 minutes) [00239]  Additional cognitive-communication [30750]:  -problem solving  -immediate recall  -delayed recall  -category exclusion  -pt educ     Patient/Caregiver  Education: [x] Review HEP      HEP/Handouts given: Simple mathematical problem solving, planning task    Pain Level (0-10 scale) post treatment: 3    ASSESSMENT     [x]   Improving appropriately and progressing toward goals  []   Improving slowly and progressing toward goals  []   Approximating goals/maximum potential  [x]   Continues to benefit from skilled therapy to address remaining functional deficits  []   Not progressing toward goals and plan of

## 2023-11-27 NOTE — PROGRESS NOTES
PHYSICAL / OCCUPATIONAL THERAPY - DAILY TREATMENT NOTE (updated )    Patient Name: Xi Gamino    Date: 2023    : 2000  Insurance: Payor: Odette Sparks / Plan: [facility] [employer]KEEPERS PLUS / Product Type: *No Product type* /      Patient  verified yes     Visit #   Current / Total 28 28-35   Time   In / Out 1:25 2:00   Pain   In / Out 3 2   Subjective Functional Status/Changes: Pt reports she drank too much on Thanksgiving and was recovering from that for 2 days. Pt reports woke up late today so is having a little neck and HA pain. Pt reports she tried not taking her medication, but it does make a difference in how she sleeps, so she has been taking it. Next PN due 23   FERNANDO  Auth date 15 visits 23    TREATMENT AREA =  Unsteadiness on feet [R26.81]  Concussion [S06. 0XAA]    OBJECTIVE      Therapeutic Procedures: Tx Min Billable or 1:1 Min (if diff from Tx Min) Procedure, Rationale, Specifics   10  70800 Therapeutic Exercise (timed):  increase ROM, strength, coordination, balance, and proprioception to improve patient's ability to progress to PLOF and address remaining functional goals. (see flow sheet as applicable)     Details if applicable:       15  04449 Therapeutic Activity (timed):  use of dynamic activities replicating functional movements to increase ROM, strength, coordination, balance, and proprioception in order to improve patient's ability to progress to PLOF and address remaining functional goals. (see flow sheet as applicable)     Details if applicable:     10  82058 Neuromuscular Re-Education (timed):  improve balance, coordination, kinesthetic sense, posture, core stability and proprioception to improve patient's ability to develop conscious control of individual muscles and awareness of position of extremities in order to progress to PLOF and address remaining functional goals.  (see flow sheet as applicable)     Details if

## 2023-11-30 ENCOUNTER — APPOINTMENT (OUTPATIENT)
Facility: HOSPITAL | Age: 23
End: 2023-11-30
Payer: MEDICAID

## 2023-11-30 ENCOUNTER — HOSPITAL ENCOUNTER (OUTPATIENT)
Facility: HOSPITAL | Age: 23
Setting detail: RECURRING SERIES
End: 2023-11-30
Payer: MEDICAID

## 2023-11-30 PROCEDURE — 97130 THER IVNTJ EA ADDL 15 MIN: CPT

## 2023-11-30 PROCEDURE — 97129 THER IVNTJ 1ST 15 MIN: CPT

## 2023-11-30 NOTE — PROGRESS NOTES
ST DAILY TREATMENT NOTE    Patient Name: Allie Quintero  Date:2023  : 2000  [x]  Patient  Verified  Payor: Payor: Jamey Chopra / Plan: [employer]ERS [employer]US / Product Type: *No Product type* /   In time:1324  Out time:1401  Total Treatment Time (min): 37  Visit #: 7 of 8  PN due 2023; 15V until 23    Treatment Diagnosis: Cognitive communication deficit [R41.841]    SUBJECTIVE  Pain Level (0-10 scale): 4    Subjective functional status/changes:   [] No changes reported  Pt reported increased pain on her back and her leg. She will be seeing Dr. Goble Buerger either on Dec 4 or Dec 5 and will be discussing with him the pain she is experiencing. OBJECTIVE  Treatment provided includes:  Increase/Improve:  []  Voice Quality [x]  Cognitive Linguistic Skills []  Laryngeal/Pharyngeal Exercises   []  Vocal Loudness []  Reading Comprehension []  Swallowing Skills    []  Vocal Cord Function []  Auditory Comprehension []  Oral Motor Skills   []  Resonance []  Writing Skills [x]  Compensatory strategies    []  Speech Intelligibility []  Expressive Language []  Attention   []  Breath Support/Coord.  []  Receptive language [x]  Memory   []  Articulation []  Safety Awareness [x] Pt educ   []  Fluency []  Word Retrieval [x] Problem solving       Treatment Provided:  Cognitive-communication therapy (initial 15 minutes) [59286]  Additional cognitive-communication [22019]:  -Cog probe   -immediate recall  -delayed recall  -auditory comprehension  -paraphrasing  -math word problems  -reasoning  -pt educ    Patient/Caregiver  Education: [x] Review HEP      HEP/Handouts given: Review HEP    Pain Level (0-10 scale) post treatment: 4    ASSESSMENT     [x]   Improving appropriately and progressing toward goals  []   Improving slowly and progressing toward goals  []   Approximating goals/maximum potential  [x]   Continues to benefit from skilled therapy to address remaining functional complete reasoning tasks with 80% acc give Gloria in order to promote safety/independence and overall communication competence. Current: During informal cognitive probe, pt completed reasoning tasks (e.g., convergent naming, similarities and differences, abstract sequencing, and inferencing) with 79% acc given min-mod verbal cues. -progressing/continue addressing. PLAN  [x]  Continue plan of care  []  Modify Goals/Treatment Plan      []  Discharge due to:  [] Other:    Balbina Dixon.  Luci Ryan M.A., Marycruz Field  Speech Language Pathologist   11/30/2023  8:18 AM    Future Appointments   Date Time Provider [address]   11/30/2023  1:20 PM Shanaeradha Joselyn, [place] [facility]   12/4/2023  1:45 PM DO ELIUD Gordillo BS AMB   12/5/2023  2:00 PM Lou Carrasquillo, PT [facility]   12/5/2023  2:40 PM Lizzy Mckennace, [place] [facility]   12/7/2023  2:00 PM Lou Carrasquillo, PT [facility]   12/7/2023  2:40 PM Lizzy Mckennace, [place] [facility]   12/12/2023  1:20 PM Lizzy Alves, [place] [facility]   12/12/2023  2:00 PM Lou Carrasquillo, PT [facility]   12/14/2023  1:20 PM Lizzy Mckennace, [place] [facility]   12/14/2023  2:00 PM Lou Carrasquillo, PT [facility]   12/19/2023  1:20 PM Lizzy Alves, [place] [facility]   12/19/2023  2:00 PM Lou Carrasquillo, PT [facility]   12/21/2023  1:20 PM Lizzy Alves [facility]   12/21/2023  2:00 PM Lou Carrasquillo, PT [facility]   12/26/2023  1:20 PM Lou Carrasquillo, PT [facility]   12/28/2023  2:40 PM Lou Carrasquillo, PT [facility]   1/9/2024  1:15 PM MD NEO Bee BS AMB   1/12/2024 10:45 AM DO FIDE Barton BS AMB   8/19/2024 11:40 AM DUDLEY Burr

## 2023-12-04 ENCOUNTER — HOSPITAL ENCOUNTER (OUTPATIENT)
Facility: HOSPITAL | Age: 23
Discharge: HOME | End: 2023-12-07

## 2023-12-04 ENCOUNTER — OFFICE VISIT (OUTPATIENT)
Age: 23
End: 2023-12-04
Payer: MEDICAID

## 2023-12-04 VITALS — HEIGHT: 65 IN | BODY MASS INDEX: 18.16 KG/M2 | WEIGHT: 109 LBS | RESPIRATION RATE: 14 BRPM

## 2023-12-04 DIAGNOSIS — S39.012A LUMBAR STRAIN, INITIAL ENCOUNTER: ICD-10-CM

## 2023-12-04 DIAGNOSIS — M99.03 LUMBAR REGION SOMATIC DYSFUNCTION: ICD-10-CM

## 2023-12-04 DIAGNOSIS — S16.1XXA CERVICAL STRAIN, INITIAL ENCOUNTER: ICD-10-CM

## 2023-12-04 DIAGNOSIS — M99.07 UPPER EXTREMITY SOMATIC DYSFUNCTION: ICD-10-CM

## 2023-12-04 DIAGNOSIS — S06.0XAD CONCUSSION WITH LOSS OF CONSCIOUSNESS STATUS UNKNOWN, SUBSEQUENT ENCOUNTER: Primary | ICD-10-CM

## 2023-12-04 DIAGNOSIS — M99.02 THORACIC REGION SOMATIC DYSFUNCTION: ICD-10-CM

## 2023-12-04 DIAGNOSIS — M99.05 PELVIC SOMATIC DYSFUNCTION: ICD-10-CM

## 2023-12-04 DIAGNOSIS — M99.04 SACRAL REGION SOMATIC DYSFUNCTION: ICD-10-CM

## 2023-12-04 DIAGNOSIS — M99.01 CERVICAL SOMATIC DYSFUNCTION: ICD-10-CM
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
## 2023-12-04 DIAGNOSIS — M99.09 SOMATIC DYSFUNCTION OF ABDOMINAL REGION: ICD-10-CM

## 2023-12-04 DIAGNOSIS — M99.08 RIB CAGE REGION SOMATIC DYSFUNCTION: ICD-10-CM

## 2023-12-04 DIAGNOSIS — M99.06 LOWER LIMB REGION SOMATIC DYSFUNCTION: ICD-10-CM

## 2023-12-04 PROCEDURE — 99215 OFFICE O/P EST HI 40 MIN: CPT | Performed by: FAMILY MEDICINE

## 2023-12-04 PROCEDURE — 98929 OSTEOPATH MANJ 9-10 REGIONS: CPT | Performed by: FAMILY MEDICINE

## 2023-12-04 RX ORDER — MIRTAZAPINE 7.5 MG/1
22.5-45 TABLET, FILM COATED ORAL NIGHTLY
Qty: 150 TABLET | Refills: 0 | Status: SHIPPED | OUTPATIENT
Start: 2023-12-04
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
## 2023-12-05 ENCOUNTER — HOSPITAL ENCOUNTER (OUTPATIENT)
Facility: HOSPITAL | Age: 23
Setting detail: RECURRING SERIES
Discharge: HOME | End: 2023-12-08
Payer: MEDICAID

## 2023-12-05 PROCEDURE — 97110 THERAPEUTIC EXERCISES: CPT

## 2023-12-05 PROCEDURE — 97112 NEUROMUSCULAR REEDUCATION: CPT

## 2023-12-05 PROCEDURE — 97530 THERAPEUTIC ACTIVITIES: CPT

## 2023-12-05 PROCEDURE — 97130 THER IVNTJ EA ADDL 15 MIN: CPT

## 2023-12-05 PROCEDURE — 97129 THER IVNTJ 1ST 15 MIN: CPT

## 2023-12-05 NOTE — PROGRESS NOTES
[facility]  [facility], [address] [place] [address], [place],  Enrique Wilson VM:[phone] Fx: [phone]    Speech Therapy Physician Update    Reporting Period 23 to 23    [x] Progress Note  [] Discharge Summary    Patient Name: Tracey Alejandra : 2000   Treatment/Medical   Diagnosis:  Cognitive communication deficit [R41.841]    Onset Date: 23     Referral Source: Mag Pulido, DO Start of Care [facility]): 8/15/2023   Prior Hospitalization: See medical history Provider #: [id number]   Prior Level of Function: All aspects of speech/language, cognition, voice, and swallowing WNLs   Comorbidities: Headache/migraine, neck/back pain, depression, anxiety, IBS, interstitial cystitis, sleep dysfunction   Medications: Verified on Patient Summary List   Visits from Start of Care: 13    Missed Visits: 2    Status at Evaluation/Last Progress Note:   10/16/2023: An informal cognitive-communication evaluation was completed to probe pt's progress in her goals . Pt was probed in basic orientation, LTM recall, STM/immediate recall, delayed recall, paragraph recall, attention, categorization, similarities and differences, sequencing, basic safety, mathematical reasoning, and medication label comprehension. Pt required increased response time during tasks and intermittently required x2 reps of stimuli.       Basic orientation: 77  LTM: 77  STM:  given increased response time  Paragraph recall: 3/6 given increased response time and x2 reps  Delayed recall: 4/4 items given increased response time   Attention: 2/3   Categorization: 4/5  Similarities and differences: 2/3 LG increase to 3/3 given min verbal cue  Sequencin/3  Basic safety: 2/5   Mathematical reasoning: 3/3 given increased response time and x2 reps  Medication label comprehension: 5/5    Reassessment (2023):  SLP re-administered portions of the [employer] Edition, a
Orientation (Remote Memory) Not administered         V. Spatial Orientation  Not administered         VI. Orientation to Environment  Not administered         VII. Recall of General Information  26 11 63 Moderate   VIII. Problem Solving and Abstract Reasoning 25 12 75 Moderate   IX. Organization 27 15 95 Mild   X. Auditory Processing and Retention 26 10 50 Marked     Impression:  Per RIPA-2 evaluation, pt presents with mild-moderate cognitive-linguistic impairment c/b deficits in short-term recall, recall of general information, problem solving and abstract reasoning, and auditory processing. Compared to previous reassessment of RIPA-2, pt demo significant improvements and continued use of memory comp strategies, problem solving strategies, and abstract reasoning. During assessment, pt also presents with increased response time (~5 sec responses) and increased accuracy in auditory comprehension and processing. Pt also with decreased tangential responses during RIPA administration. Skilled OP ST continues to be medically necessary to address these deficits to improve communication for ADLs, social communication, safety, QOL, and dignity. [x]   Improving appropriately and progressing toward goals  []   Improving slowly and progressing toward goals  []   Approximating goals/maximum potential  [x]   Continues to benefit from skilled therapy to address remaining functional deficits  []   Not progressing toward goals and plan of care will be adjusted      Progress towards goals / Updated goals:  1) Pt will recall at least 3 recall strategies (e.g., WRAP, keywords, associations, therapy notebook, and chunking/grouping) and use them appropriately in 70% of opportunities given with modA to increase short-term recall to increase safety in the functional living environment.    Current: Pt utilized association and picturing strategy to recall 3/3 items during RIPA-2 readministration. -met; advance goal.      2) Pt will

## 2023-12-05 NOTE — PROGRESS NOTES
[facility]   12/14/2023  1:20 PM Ledlata Leblanc, [facility]   12/14/2023  2:00 PM Julisa Lynaomy, PT [facility]   12/19/2023  1:20 PM Ledora Siajourdan, [facility]   12/19/2023  2:00 PM Julisa Lynaomy, PT [facility]   12/21/2023  1:20 PM Gina Leblanc, [facility]   12/21/2023  2:00 PM Julisa Aguilera, PT [facility]   12/26/2023  1:20 PM Julisa Aguilera, PT [facility]   12/28/2023  2:40 PM Julisa Aguilera, PT [facility]   1/8/2024  2:00 PM DO ELIUD Marlow BS AMB   1/9/2024  1:15 PM MD NEO Blair BS AMB   1/12/2024 10:45 AM DO FIDE Mckeon BS AMB   8/19/2024 11:40 AM Jaleel Bahena PA-C [facility]

## 2023-12-07 ENCOUNTER — HOSPITAL ENCOUNTER (OUTPATIENT)
Facility: HOSPITAL | Age: 23
Setting detail: RECURRING SERIES
Discharge: HOME | End: 2023-12-10
Payer: MEDICAID

## 2023-12-07 PROCEDURE — 97162 PT EVAL MOD COMPLEX 30 MIN: CPT

## 2023-12-07 PROCEDURE — 97130 THER IVNTJ EA ADDL 15 MIN: CPT

## 2023-12-07 PROCEDURE — 97129 THER IVNTJ 1ST 15 MIN: CPT
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
## 2023-12-07 NOTE — THERAPY EVALUATION
of care has been reviewed with JENNY Escobedo, PT       12/7/2023       3:10 PM  ===================================================================  I certify that the above Therapy Services are being furnished while the patient is under my care. I agree with the treatment plan and certify that this therapy is necessary. Physician's Signature:_________________________   DATE:_________   TIME:________                           Vanessa Mari DO    ** Signature, Date and Time must be completed for valid certification **  Please sign and return to [facility] or you may fax the signed copy to [phone]. Thank you.

## 2023-12-07 NOTE — PROGRESS NOTES
chunking/grouping) and use them appropriately in 80% of opportunities given with Gloria to increase short-term recall to increase safety in the functional living environment. Current: Not targeted this date. 2) Pt will increasing auditory processing by recall information re: 3+ sentence paragraphs/instructions/novel information presented orally with 80% acc given Gloria. Current: Not targeted this date. 3) Pt will immediately recall a list of at least 5 items and 7+ word sentences (e.g., medication list, to-do list, novel information, etc)  with 80% acc given Gloria and use of comp strategies in various structured tasks to improve safety, independence and communication competence. Current: Not targeted this date. 4) Pt will complete moderately complex arithmetic/safety awareness/ logical problem solving  tasks with 80% acc when provided with Gloria cues/ reps to promote IADLs within her environment. Current: Pt accurately answered logical problem solving questions re: advertisements with 71% acc given min-mod verbal cues. Pt accurately answered percentages arithmetic questions to determine discounts with 71% acc given min-mod verbal cues. -progressing/continue addressing. 5) Pt will complete reasoning tasks with 80% acc give Gloria in order to promote safety/independence and overall communication competence. Current: Pt answered tricky, inferential questions to identify logical solutions with 53% acc given min verbal cues. -progressing/continue addressing. PLAN  [x]  Continue plan of care  []  Modify Goals/Treatment Plan      []  Discharge due to:  [] Other:    Vadim Arellano.  Chelsea Sandy M.A., [address] [facility]  Speech Language Pathologist   12/7/2023  8:59 AM    Future Appointments   Date Time Provider [address]   12/7/2023  2:00 PM Charlene Dias PT [facility]   12/7/2023  2:40 [facility]   12/12/2023  1:20 [facility]   12/12/2023  2:00

## 2023-12-12 ENCOUNTER — HOSPITAL ENCOUNTER (OUTPATIENT)
Facility: HOSPITAL | Age: 23
Setting detail: RECURRING SERIES
End: 2023-12-12
Payer: MEDICAID

## 2023-12-12 ENCOUNTER — APPOINTMENT (OUTPATIENT)
Facility: HOSPITAL | Age: 23
End: 2023-12-12
Payer: MEDICAID

## 2023-12-14 ENCOUNTER — APPOINTMENT (OUTPATIENT)
Facility: HOSPITAL | Age: 23
End: 2023-12-14
Payer: MEDICAID

## 2023-12-19 ENCOUNTER — HOSPITAL ENCOUNTER (OUTPATIENT)
Facility: HOSPITAL | Age: 23
Setting detail: RECURRING SERIES
End: 2023-12-19
Payer: MEDICAID

## 2023-12-19 ENCOUNTER — APPOINTMENT (OUTPATIENT)
Facility: HOSPITAL | Age: 23
End: 2023-12-19
Payer: MEDICAID

## 2023-12-21 ENCOUNTER — HOSPITAL ENCOUNTER (OUTPATIENT)
Facility: HOSPITAL | Age: 23
Setting detail: RECURRING SERIES
Discharge: HOME | End: 2023-12-24
Payer: MEDICAID

## 2023-12-21 ENCOUNTER — HOSPITAL ENCOUNTER (OUTPATIENT)
Facility: HOSPITAL | Age: 23
Setting detail: RECURRING SERIES
End: 2023-12-21
Payer: MEDICAID

## 2023-12-21 PROCEDURE — 97110 THERAPEUTIC EXERCISES: CPT

## 2023-12-21 PROCEDURE — 97116 GAIT TRAINING THERAPY: CPT

## 2023-12-21 PROCEDURE — G0283 ELEC STIM OTHER THAN WOUND: HCPCS

## 2023-12-21 PROCEDURE — 97530 THERAPEUTIC ACTIVITIES: CPT

## 2023-12-21 PROCEDURE — 97112 NEUROMUSCULAR REEDUCATION: CPT

## 2023-12-21 NOTE — PROGRESS NOTES
weekly  - Bridge with Hip Abduction and Resistance  - 1 x daily - 7 x weekly - 1 sets - 10 reps - 3 second hold  - Clam with Resistance  - 1 x daily - 7 x weekly - 1 sets - 10 reps - 3 second hold  - Supine Lower Trunk Rotation  - 1 x daily - 7 x weekly - 1 sets - 10 reps - 3 second hold  - Supine Figure 4 Piriformis Stretch  - 1 x daily - 7 x weekly - 1 sets - 2-3 reps - 30 second hold  [] Other detail:       Objective Information/Functional Measures/Assessment    Exercises per flow sheet  Progressed VOR exercise to VOR x 2 seated; progressed EO/EC balance on foam  Added bridge with band, clam with band for glut activation/strengthening  Added LTR, piriformis stretch  Performed gait with HT forward and backward for increased challenge    Patient will continue to benefit from skilled PT / OT services to modify and progress therapeutic interventions, analyze and address functional mobility deficits, analyze and address ROM deficits, analyze and address strength deficits, analyze and address soft tissue restrictions, analyze and cue for proper movement patterns, analyze and modify for postural abnormalities, analyze and address imbalance/dizziness, and instruct in home and community integration to address functional deficits and attain remaining goals. Progress toward goals / Updated goals:  []  See Progress Note/Recertification    Progressing toward goals:  Short Term Goals: To be accomplished in 2-3 weeks   Patient will demonstrate compliance with HEP for management of back pain. Status at IE Initiated  Current Status Updated this session  Long Term Goals: To be accomplished in 4-6 weeks   Patient will score less than or equal to 30/1332 on CSI to allow improved activity tolerance. Status at last assessment 40/132  Current Status 49/132  2. Patient will demonstrate total scores <10 on VOMS to allow improved activity tolerance.   Status at last assessment 14  Current Status NT due to time constraints

## 2023-12-26 ENCOUNTER — APPOINTMENT (OUTPATIENT)
Facility: HOSPITAL | Age: 23
End: 2023-12-26
Payer: MEDICAID

## 2023-12-28 ENCOUNTER — APPOINTMENT (OUTPATIENT)
Facility: HOSPITAL | Age: 23
End: 2023-12-28
Payer: MEDICAID

## 2024-01-05 ENCOUNTER — HOSPITAL ENCOUNTER (OUTPATIENT)
Facility: HOSPITAL | Age: 24
Setting detail: RECURRING SERIES
Discharge: HOME | End: 2024-01-08
Payer: MEDICAID

## 2024-01-05 PROCEDURE — 97112 NEUROMUSCULAR REEDUCATION: CPT

## 2024-01-05 PROCEDURE — 97530 THERAPEUTIC ACTIVITIES: CPT

## 2024-01-05 PROCEDURE — 97110 THERAPEUTIC EXERCISES: CPT

## 2024-01-05 NOTE — PROGRESS NOTES
PHYSICAL THERAPY - DAILY TREATMENT NOTE (updated )    Patient Name: Estee Galan    Date: 2024    : 2000  Insurance: Payor: [facility] MEDICAID / Plan: VEGA [facility] FAMIS HE[employer]KEEPERS PLUS / Product Type: *No Product type* /      Patient  verified yes     Visit #   Current / Total 32 38-48   Time   In / Out 10:03 10:43   Pain   In / Out 3/10 3/10   Subjective Functional Status/Changes: Reports her LBP is \"about the same  but it is starting to loosen up some.\"     TREATMENT AREA =  Unsteadiness on feet [R26.81]  Concussion [S06.0XAA]    Next PN/ RC due 2023  Auth due 8 visits, 2023-2024    OBJECTIVE    Therapeutic Procedures:  Tx Min Billable or 1:1 Min (if diff from Tx Min) Procedure, Rationale, Specifics   12  36565 Therapeutic Exercise (timed):  increase ROM, strength, coordination, balance, and proprioception to improve patient's ability to progress to PLOF and address remaining functional goals. (see flow sheet as applicable)     Details if applicable:  ROM, MMT     8  21476 Therapeutic Activity (timed):  use of dynamic activities replicating functional movements to increase ROM, strength, coordination, balance, and proprioception in order to improve patient's ability to progress to PLOF and address remaining functional goals.  (see flow sheet as applicable)     Details if applicable:  NuStep   20  80058 Neuromuscular Re-Education (timed):  improve balance, coordination, kinesthetic sense, posture, core stability and proprioception to improve patient's ability to develop conscious control of individual muscles and awareness of position of extremities in order to progress to PLOF and address remaining functional goals. (see flow sheet as applicable)     Details if applicable:  VOMS, CSI   40  [facility] Totals Reminder: bill using total billable min of TIMED therapeutic procedures (example: do not include dry needle or estim unattended, both untimed codes, in totals to left)  8- 
Headache  (0-10) Dizzines  (0-10) Nausea  (0-10) Fogginess  (0-10) Totals Comments   Baseline Symptoms (0-10 scale)         3 1 1 2 7     Smooth Pursuits (2 reps each horizontal and vertical)   3 1 1 2 7     Saccades - Horizontal (10 reps)   3 3 1 2 9 Slow movements   Saccades - Vertical (10 reps)   3 3 1 2 9 Slow movements   Convergence (near point) (3 reps)   3 3 1 2 9 Measure 1: 4 cm  Measure 2: 4 cm  Measure 3: 4 cm  Average: 4 cm   VOR - Horizontal (10 reps) - seated with metronome at 180 beats/min; focus on 14 point font A   3 4 1 3 11     VOR - Vertical (10 reps) - seated with metronome at 180 beats/min, focus on 14 point font A   3 3 1 3 10     Visual Motion Sensitivity/ VOR Cancellation (5 reps) - standing with metronome at 50 beats/min; focus on thumb   3 3 1 3 10     *Totals > 2 and NPC > 5 cm represent clinically significant cut offs    Progress to Goals:     Short Term Goals: To be accomplished in 2-3 weeks   Patient will demonstrate compliance with HEP for management of back pain.  Status at IE Initiated  Current status: reports compliance with HEP  GOAL MET  Long Term Goals: To be accomplished in 4-6 weeks   Patient will score less than or equal to 30/1332 on CSI to allow improved activity tolerance.  Status at last assessment 40/132  Current Status 40/132   GOAL NOT MET  2.  Patient will demonstrate total scores <10 on VOMS to allow improved activity tolerance.  Status at last assessment: totals range from 10 to 14  Current Status: totals range from 7 to 11  Progressing toward goal      Non-Medicare, can change goals, can adjust or add frequency duration, no signature required      New Goals to be achieved in __3-4__ weeks  1. Patient will score less than or equal to 30/1332 on CSI to allow improved activity tolerance.  2. Patient will demonstrate total scores < 10 on VOMS to allow improved activity tolerance.  3. Patient will score > 64/100 on FOTO (Focused on Therapeutic Outcomes) thoracic spine to

## 2024-01-08 ENCOUNTER — OFFICE VISIT (OUTPATIENT)
Age: 24
End: 2024-01-08
Payer: MEDICAID

## 2024-01-08 ENCOUNTER — HOSPITAL ENCOUNTER (OUTPATIENT)
Facility: HOSPITAL | Age: 24
Setting detail: RECURRING SERIES
Discharge: HOME | End: 2024-01-11
Payer: MEDICAID

## 2024-01-08 VITALS — RESPIRATION RATE: 14 BRPM | BODY MASS INDEX: 19.49 KG/M2 | WEIGHT: 117 LBS | HEIGHT: 65 IN

## 2024-01-08 DIAGNOSIS — S16.1XXD CERVICAL STRAIN, SUBSEQUENT ENCOUNTER: ICD-10-CM
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
## 2024-01-08 DIAGNOSIS — M99.06 LOWER LIMB REGION SOMATIC DYSFUNCTION: ICD-10-CM

## 2024-01-08 DIAGNOSIS — M99.08 RIB CAGE REGION SOMATIC DYSFUNCTION: ICD-10-CM

## 2024-01-08 DIAGNOSIS — M99.02 THORACIC REGION SOMATIC DYSFUNCTION: ICD-10-CM

## 2024-01-08 DIAGNOSIS — M99.07 UPPER EXTREMITY SOMATIC DYSFUNCTION: ICD-10-CM

## 2024-01-08 DIAGNOSIS — M99.01 CERVICAL SOMATIC DYSFUNCTION: ICD-10-CM

## 2024-01-08 DIAGNOSIS — M99.04 SACRAL REGION SOMATIC DYSFUNCTION: ICD-10-CM

## 2024-01-08 DIAGNOSIS — M99.05 PELVIC SOMATIC DYSFUNCTION: ICD-10-CM

## 2024-01-08 DIAGNOSIS — M99.09 SOMATIC DYSFUNCTION OF ABDOMINAL REGION: ICD-10-CM

## 2024-01-08 DIAGNOSIS — S39.012D LUMBAR STRAIN, SUBSEQUENT ENCOUNTER: ICD-10-CM

## 2024-01-08 DIAGNOSIS — F07.81 POSTCONCUSSIVE SYNDROME: Primary | ICD-10-CM

## 2024-01-08 PROCEDURE — 98929 OSTEOPATH MANJ 9-10 REGIONS: CPT | Performed by: FAMILY MEDICINE

## 2024-01-08 PROCEDURE — 97112 NEUROMUSCULAR REEDUCATION: CPT

## 2024-01-08 PROCEDURE — 97129 THER IVNTJ 1ST 15 MIN: CPT

## 2024-01-08 PROCEDURE — 97530 THERAPEUTIC ACTIVITIES: CPT

## 2024-01-08 PROCEDURE — 97130 THER IVNTJ EA ADDL 15 MIN: CPT

## 2024-01-08 PROCEDURE — 99214 OFFICE O/P EST MOD 30 MIN: CPT | Performed by: FAMILY MEDICINE

## 2024-01-08 PROCEDURE — 97110 THERAPEUTIC EXERCISES: CPT

## 2024-01-08 NOTE — PROGRESS NOTES
VICENTE HERNANDEZ [facility] - [facility]  [address], [address], [place] [address] Ph:[phone] Fx: [phone]    Speech Therapy Physician Update    Reporting Period 24 to 24    [x] Progress Note  [] Discharge Summary    Patient Name: sEtee Galan : 2000   Treatment/Medical   Diagnosis: Cognitive communication deficit [R41.841]     Onset Date: 23      Referral Source: Dwight Putnam DO Start of Care (SOC): 8/15/2023    Prior Hospitalization: See medical history Provider #: [id number]   Prior Level of Function: All aspects of speech/language, cognition, voice, and swallowing WNLs    Comorbidities: Headache/migraine, neck/back pain, depression, anxiety, IBS, interstitial cystitis, sleep dysfunction    Medications: Verified on Patient Summary List   Visits from Start of Care: 14    Missed Visits: 3    Status at Evaluation/Last Progress Note:   Reassessment (23):  SLP re-administered portions of the Ross Information Processing Assessment-2nd Edition, a comprehensive, norm-referenced, assessment battery designed to identify, describe, and quantify cognitive-linguistic problems. The following are the pt's performance in each subtest.      Subtest Raw Score Standardized Score Percentile Severity   I. Immediate Memory  27 (+6 improved) 14 (+5 improved) 91 (+54 improved) Mild (improved)   II. Recent Memory 27 (+8 improved) 12 (+4 improved) 75 (+46 improved) Moderate (improved)   III. Temporal Orientation (Recent Memory) 30 (+3 improved) 14 (+3 improved) 91 (+28 improved) Mild (improved)   IV. Temporal Orientation (Remote Memory) Not administered         V. Spatial Orientation  Not administered         VI. Orientation to Environment  Not administered         VII. Recall of General Information  26 (+5 improved) 11 (+2 improved) 63 (+26 improved) Moderate (improved)   VIII. Problem Solving and Abstract Reasoning 25 (+5 improved from evaluation) 12 (+3 improved from

## 2024-01-08 NOTE — PROGRESS NOTES
64/100 on FOTO (Focused on Therapeutic Outcomes) thoracic spine to indicate improved tolerance with functional activities.     PLAN  Yes  Continue plan of care  [x]  Upgrade activities as tolerated  []  Discharge due to :  []  Other:    Fawn Belcher, PT    1/8/2024    4:06 PM    Future Appointments   Date Time Provider Department Center   1/9/2024  1:15 PM Nasim Ward MD [facility] AMB   1/12/2024 10:45 AM Andrew Schofield,  [facility] AMB   2/19/2024  1:00 PM Dwight Putnam DO [facility] AMB   8/19/2024 11:40 AM Antonina Gill PA-C [facility]

## 2024-01-08 NOTE — PROGRESS NOTES
AVS reviewed: yes,   HEP: N/A  Resources Provided: no   Patient questions/concerns answered: yes,   Patient verbalized understanding of treatment plan: yes,
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
## 2024-01-23 ENCOUNTER — HOSPITAL ENCOUNTER (OUTPATIENT)
Facility: HOSPITAL | Age: 24
Setting detail: RECURRING SERIES
Discharge: HOME | End: 2024-01-26
Payer: MEDICAID

## 2024-01-23 PROCEDURE — 97112 NEUROMUSCULAR REEDUCATION: CPT

## 2024-01-23 PROCEDURE — 97130 THER IVNTJ EA ADDL 15 MIN: CPT

## 2024-01-23 PROCEDURE — 97530 THERAPEUTIC ACTIVITIES: CPT

## 2024-01-23 PROCEDURE — 97129 THER IVNTJ 1ST 15 MIN: CPT

## 2024-01-23 PROCEDURE — 97110 THERAPEUTIC EXERCISES: CPT

## 2024-01-23 NOTE — PROGRESS NOTES
PHYSICAL / OCCUPATIONAL THERAPY - DAILY TREATMENT NOTE (updated )    Patient Name: Estee Galan    Date: 2024    : 2000  Insurance: Payor: [facility] MEDICAID / Plan: VEGA [facility] FAM HEALTHKEEPERS PLUS / Product Type: *No Product type* /      Patient  verified yes     Visit #   Current / Total 34 38-48   Time   In / Out 2:03 2:42   Pain   In / Out 3 back pain 2 back pain   Subjective Functional Status/Changes: \"It's the back.\" (Bothering her today.)     Next PN/ RC due 2024  Auth due 8 visits, 2024-2024 ( this session)    TREATMENT AREA =  Unsteadiness on feet [R26.81]  Concussion [S06.0XAA]    OBJECTIVE    Therapeutic Procedures:  Tx Min Billable or 1:1 Min (if diff from Tx Min) Procedure, Rationale, Specifics   12  89411 Therapeutic Exercise (timed):  increase ROM, strength, coordination, balance, and proprioception to improve patient's ability to progress to PLOF and address remaining functional goals. (see flow sheet as applicable)     Details if applicable:       17  92709 Therapeutic Activity (timed):  use of dynamic activities replicating functional movements to increase ROM, strength, coordination, balance, and proprioception in order to improve patient's ability to progress to PLOF and address remaining functional goals.  (see flow sheet as applicable)     Details if applicable:     10  66039 Neuromuscular Re-Education (timed):  improve balance, coordination, kinesthetic sense, posture, core stability and proprioception to improve patient's ability to develop conscious control of individual muscles and awareness of position of extremities in order to progress to PLOF and address remaining functional goals. (see flow sheet as applicable)     Details if applicable:     39  [facility] Totals Reminder: bill using total billable min of TIMED therapeutic procedures (example: do not include dry needle or estim unattended, both untimed codes, in totals to left)  8-22 min = 1

## 2024-01-23 NOTE — PROGRESS NOTES
ST DAILY TREATMENT NOTE    Patient Name: Estee Galan  Date:2024  : 2000  [x]  Patient  Verified  Payor: Payor: [facility] MEDICAID / Plan: VEGA [facility] FAMIS HE[employer]KEEPERS PLUS / Product Type: *No Product type* /   In time:1447  Out time:1520  Total Treatment Time (min): 33  Visit #: 2 of 8  PN due 2024     Treatment Diagnosis: Cognitive communication deficit [R41.841]       SUBJECTIVE  Pain Level (0-10 scale): 4    Subjective functional status/changes:   [] No changes reported  Pt reported that she intermittently has a headache but her back and her neck has been causing her more consistent pain. However, she reported overall improvement in cognition, including memory.     OBJECTIVE  Treatment provided includes:  Increase/Improve:  []  Voice Quality [x]  Cognitive Linguistic Skills []  Laryngeal/Pharyngeal Exercises   []  Vocal Loudness []  Reading Comprehension []  Swallowing Skills    []  Vocal Cord Function []  Auditory Comprehension []  Oral Motor Skills   []  Resonance []  Writing Skills [x]  Compensatory strategies    []  Speech Intelligibility []  Expressive Language []  Attention   []  Breath Support/Coord. []  Receptive language [x]  Memory   []  Articulation []  Safety Awareness [x] Pt educ   []  Fluency []  Word Retrieval []        Treatment Provided:  Cognitive-communication therapy (initial 15 minutes) [02968]  Additional cognitive-communication [59924]:  -delayed memory  -immediate memory    Patient/Caregiver  Education: [x] Review HEP      HEP/Handouts given: Inferential problem solving tasks ([place] tour bus schedule, gardening plot)    Pain Level (0-10 scale) post treatment: 4    ASSESSMENT     [x]   Improving appropriately and progressing toward goals  []   Improving slowly and progressing toward goals  []   Approximating goals/maximum potential  [x]   Continues to benefit from skilled therapy to address remaining functional deficits  []   Not progressing toward goals and

## 2024-01-25 ENCOUNTER — TELEPHONE (OUTPATIENT)
Facility: HOSPITAL | Age: 24
End: 2024-01-25

## 2024-01-25 NOTE — TELEPHONE ENCOUNTER
SLP attempted to reach out to pt re: scheduled appt at 2:40 pm after 10-min angelica period, however pt's VM box full. Pt NS.

## 2024-01-30 ENCOUNTER — HOSPITAL ENCOUNTER (OUTPATIENT)
Facility: HOSPITAL | Age: 24
Setting detail: RECURRING SERIES
Discharge: HOME | End: 2024-02-02
Payer: MEDICAID

## 2024-01-30 PROCEDURE — 97129 THER IVNTJ 1ST 15 MIN: CPT

## 2024-01-30 PROCEDURE — 97130 THER IVNTJ EA ADDL 15 MIN: CPT

## 2024-01-30 PROCEDURE — 97530 THERAPEUTIC ACTIVITIES: CPT

## 2024-01-30 PROCEDURE — 97110 THERAPEUTIC EXERCISES: CPT

## 2024-01-30 NOTE — PROGRESS NOTES
PHYSICAL / OCCUPATIONAL THERAPY - DAILY TREATMENT NOTE (updated )    Patient Name: Estee Galan    Date: 2024    : 2000  Insurance: Payor: [facility] MEDICAID / Plan: VEGA [facility] FAMIS HEALTHKEEPERS PLUS / Product Type: *No Product type* /      Patient  verified yes     Visit #   Current / Total 35 38-44   Time   In / Out 1:24 2:03   Pain   In / Out 3/10 LBP  /10 HA 2/10 LBP   Subjective Functional Status/Changes: Pt's states she does have HA's, but the main thing that bothers her is her memory,  \"I can't remember anything.\"    Pt reports back sore (indicates sacrum).     Next PN due 24  RC NA  Auth date 8 visits from 24-24    TREATMENT AREA =  Unsteadiness on feet [R26.81]  Concussion [S06.0XAA]    OBJECTIVE    Therapeutic Procedures:  Tx Min Billable or 1:1 Min (if diff from Tx Min) Procedure, Rationale, Specifics   19  85793 Therapeutic Exercise (timed):  increase ROM, strength, coordination, balance, and proprioception to improve patient's ability to progress to PLOF and address remaining functional goals. (see flow sheet as applicable)     Details if applicable:         47334 Therapeutic Activity (timed):  use of dynamic activities replicating functional movements to increase ROM, strength, coordination, balance, and proprioception in order to improve patient's ability to progress to PLOF and address remaining functional goals.  (see flow sheet as applicable)     Details if applicable:     39  [facility] Totals Reminder: bill using total billable min of TIMED therapeutic procedures (example: do not include dry needle or estim unattended, both untimed codes, in totals to left)  8-22 min = 1 unit; 23-37 min = 2 units; 38-52 min = 3 units; 53-67 min = 4 units; 68-82 min = 5 units   Total Total     [x]  Patient Education billed concurrently with other procedures   [x] Review HEP    [] Progressed/Changed HEP, detail:    [] Other detail:       Objective Information/Functional

## 2024-01-30 NOTE — PROGRESS NOTES
ST DAILY TREATMENT NOTE    Patient Name: Estee Galan  Date:2024  : 2000  [x]  Patient  Verified  Payor: Payor: [facility] MEDICAID / Plan: VEGA [facility] FAMIS HEALTHKEEPERS PLUS / Product Type: *No Product type* /   In time:1243  Out time:1323  Total Treatment Time (min): 40  Visit #: 3 of 8  PN due 2024      Treatment Diagnosis: Cognitive communication deficit [R41.841]       SUBJECTIVE  Pain Level (0-10 scale): 2    Subjective functional status/changes:   [x] No changes reported  Pt reported that she had progressed in her memory and using strategies. However, she reported that there would still be instances when she is responsible for something, she may forget what she was supposed to do. She reported that she would \"drift off\" a lot. She also reported that she wants to be quicker in her response time as she noted she still takes awhile to respond to questions or when she has to think about a task.     OBJECTIVE  Treatment provided includes:  Increase/Improve:  []  Voice Quality [x]  Cognitive Linguistic Skills []  Laryngeal/Pharyngeal Exercises   []  Vocal Loudness []  Reading Comprehension []  Swallowing Skills    []  Vocal Cord Function []  Auditory Comprehension []  Oral Motor Skills   []  Resonance []  Writing Skills [x]  Compensatory strategies    []  Speech Intelligibility []  Expressive Language []  Attention   []  Breath Support/Coord. []  Receptive language [x]  Memory   []  Articulation []  Safety Awareness [x] Pt educ   []  Fluency []  Word Retrieval []        Treatment Provided:  Cognitive-communication therapy (initial 15 minutes) [14331]  Additional cognitive-communication [75637]:  -immediate recall  -delayed recall  -auditory comprehension  -reasoning  -pt educ    Patient/Caregiver  Education: [x] Review HEP      HEP/Handouts given: Reasoning worksheet    Pain Level (0-10 scale) post treatment: 2    ASSESSMENT     []   Improving appropriately and progressing toward goals  []

## 2024-02-01 ENCOUNTER — TELEPHONE (OUTPATIENT)
Facility: HOSPITAL | Age: 24
End: 2024-02-01

## 2024-02-01 NOTE — TELEPHONE ENCOUNTER
SLP left VM with pt re: missed session/NS for OP ST. Left name, title, reason for call, clinic policy re: CXL/NS and call back number.

## 2024-02-05 ENCOUNTER — HOSPITAL ENCOUNTER (OUTPATIENT)
Facility: HOSPITAL | Age: 24
Setting detail: RECURRING SERIES
End: 2024-02-05
Payer: MEDICAID

## 2024-02-08 ENCOUNTER — APPOINTMENT (OUTPATIENT)
Facility: HOSPITAL | Age: 24
End: 2024-02-08
Payer: MEDICAID

## 2024-02-08 ENCOUNTER — HOSPITAL ENCOUNTER (OUTPATIENT)
Facility: HOSPITAL | Age: 24
Setting detail: RECURRING SERIES
Discharge: HOME | End: 2024-02-11
Payer: MEDICAID

## 2024-02-08 PROCEDURE — 97129 THER IVNTJ 1ST 15 MIN: CPT

## 2024-02-08 PROCEDURE — 97130 THER IVNTJ EA ADDL 15 MIN: CPT

## 2024-02-12 ENCOUNTER — HOSPITAL ENCOUNTER (OUTPATIENT)
Facility: HOSPITAL | Age: 24
Setting detail: RECURRING SERIES
End: 2024-02-12
Payer: MEDICAID

## 2024-02-12 ENCOUNTER — TELEPHONE (OUTPATIENT)
Facility: HOSPITAL | Age: 24
End: 2024-02-12

## 2024-02-12 ENCOUNTER — APPOINTMENT (OUTPATIENT)
Facility: HOSPITAL | Age: 24
End: 2024-02-12
Payer: MEDICAID

## 2024-02-12 NOTE — TELEPHONE ENCOUNTER
SLP attempted to reach out to pt re: moving her 2 pm appt today (2/12) to 2 pm tomorrow (2/13) as SLP will not be available at the time. Left VM. SLP also reached out to pt's mother and informed her of schedule change. Pt's mother provided verbal consent to move appt from 2/12 to 2/13. SLP clarified if pt will call back to confirm, however, pt's mother confirmed appt can be moved.     Susi Beltre M.A., CCC-SLP  Speech Language Pathologist

## 2024-02-13 ENCOUNTER — HOSPITAL ENCOUNTER (OUTPATIENT)
Facility: HOSPITAL | Age: 24
Setting detail: RECURRING SERIES
Discharge: HOME | End: 2024-02-16
Payer: MEDICAID

## 2024-02-13 PROCEDURE — 97130 THER IVNTJ EA ADDL 15 MIN: CPT

## 2024-02-13 PROCEDURE — 97129 THER IVNTJ 1ST 15 MIN: CPT

## 2024-02-13 NOTE — PROGRESS NOTES
ST DAILY TREATMENT NOTE    Patient Name: Estee Galan  Date:2024  : 2000  [x]  Patient  Verified  Payor: Payor: [facility] MEDICAID / Plan: VEGA [facility] FAMIS HEALTHKEEPERS PLUS / Product Type: *No Product type* /   In time:2:03  Out time:2:42  Total Treatment Time (min): 39  Visit #: 2 of 8  PN due 3/7/2024        Treatment Diagnosis: Cognitive communication deficit [R41.841]        SUBJECTIVE  Pain Level (0-10 scale): 3    Subjective functional status/changes:   [x] No changes reported  Pt reported that her memory has improved compared to how it was.     OBJECTIVE  Treatment provided includes:  Increase/Improve:  []  Voice Quality [x]  Cognitive Linguistic Skills []  Laryngeal/Pharyngeal Exercises   []  Vocal Loudness []  Reading Comprehension []  Swallowing Skills    []  Vocal Cord Function []  Auditory Comprehension []  Oral Motor Skills   []  Resonance []  Writing Skills [x]  Compensatory strategies    []  Speech Intelligibility []  Expressive Language []  Attention   []  Breath Support/Coord. []  Receptive language [x]  Memory   []  Articulation []  Safety Awareness [x] Pt educ   []  Fluency []  Word Retrieval []        Treatment Provided:  Cognitive-communication therapy (initial 15 minutes) [24153]  Additional cognitive-communication [93826]:  -memory comp strategies  -pt educ  -prioritize    Patient/Caregiver  Education: [x] Review HEP      HEP/Handouts given: Use Reminders jordan for ADLs for  and 2/15.     Pain Level (0-10 scale) post treatment: 3    ASSESSMENT     []   Improving appropriately and progressing toward goals  [x]   Improving slowly and progressing toward goals  []   Approximating goals/maximum potential  [x]   Continues to benefit from skilled therapy to address remaining functional deficits  []   Not progressing toward goals and plan of care will be adjusted      Progress towards goals / Updated goals:  1) Pt will recall at least 3 recall strategies (e.g., WRAP, keywords,

## 2024-02-15 ENCOUNTER — APPOINTMENT (OUTPATIENT)
Facility: HOSPITAL | Age: 24
End: 2024-02-15
Payer: MEDICAID

## 2024-02-20 ENCOUNTER — HOSPITAL ENCOUNTER (OUTPATIENT)
Facility: HOSPITAL | Age: 24
Setting detail: RECURRING SERIES
Discharge: HOME | End: 2024-02-23
Payer: MEDICAID

## 2024-02-20 ENCOUNTER — APPOINTMENT (OUTPATIENT)
Facility: HOSPITAL | Age: 24
End: 2024-02-20
Payer: MEDICAID

## 2024-02-20 PROCEDURE — 97129 THER IVNTJ 1ST 15 MIN: CPT

## 2024-02-20 PROCEDURE — 97130 THER IVNTJ EA ADDL 15 MIN: CPT

## 2024-02-20 NOTE — PROGRESS NOTES
ST DAILY TREATMENT NOTE    Patient Name: Estee Galan  Date:2024  : 2000  [x]  Patient  Verified  Payor: Payor: [facility] MEDICAID / Plan: VEGA [facility] FAM HEALTHKEEPERS PLUS / Product Type: *No Product type* /   In time: 2:04  Out time:2:40  Total Treatment Time (min): 36  Visit #: 3 of 8  PN due 3/7/2024      Treatment Diagnosis: Cognitive communication deficit [R41.841]       SUBJECTIVE  Pain Level (0-10 scale): 4    Subjective functional status/changes:   [] No changes reported  Pt reported back pain for the past three days. Pt reported compliance to HEP.     OBJECTIVE  Treatment provided includes:  Increase/Improve:  []  Voice Quality [x]  Cognitive Linguistic Skills []  Laryngeal/Pharyngeal Exercises   []  Vocal Loudness []  Reading Comprehension []  Swallowing Skills    []  Vocal Cord Function []  Auditory Comprehension []  Oral Motor Skills   []  Resonance []  Writing Skills [x]  Compensatory strategies    []  Speech Intelligibility []  Expressive Language []  Attention   []  Breath Support/Coord. []  Receptive language [x]  Memory   []  Articulation []  Safety Awareness [x] Pt educ   []  Fluency []  Word Retrieval []        Treatment Provided:  Cognitive-communication therapy (initial 15 minutes) [46341]  Additional cognitive-communication [02193]:  -reminders apps  -WRAP  -memory comp strategies  -auditory comprehension  -pt educ    Patient/Caregiver  Education: [x] Review HEP      HEP/Handouts given: Problem-solving worksheet    Pain Level (0-10 scale) post treatment: 4    ASSESSMENT     [x]   Improving appropriately and progressing toward goals  []   Improving slowly and progressing toward goals  []   Approximating goals/maximum potential  [x]   Continues to benefit from skilled therapy to address remaining functional deficits  []   Not progressing toward goals and plan of care will be adjusted      Progress towards goals / Updated goals:  1) Pt will recall at least 3 recall

## 2024-02-22 ENCOUNTER — OFFICE VISIT (OUTPATIENT)
Age: 24
End: 2024-02-22

## 2024-02-22 ENCOUNTER — HOSPITAL ENCOUNTER (OUTPATIENT)
Facility: HOSPITAL | Age: 24
Setting detail: RECURRING SERIES
End: 2024-02-22
Payer: MEDICAID

## 2024-02-22 ENCOUNTER — APPOINTMENT (OUTPATIENT)
Facility: HOSPITAL | Age: 24
End: 2024-02-22
Payer: MEDICAID

## 2024-02-22 VITALS — WEIGHT: 107 LBS | BODY MASS INDEX: 17.81 KG/M2
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
## 2024-02-22 DIAGNOSIS — M99.05 PELVIC SOMATIC DYSFUNCTION: ICD-10-CM

## 2024-02-22 DIAGNOSIS — M99.07 UPPER EXTREMITY SOMATIC DYSFUNCTION: ICD-10-CM

## 2024-02-22 DIAGNOSIS — M99.02 THORACIC REGION SOMATIC DYSFUNCTION: ICD-10-CM

## 2024-02-22 DIAGNOSIS — M99.04 SACRAL REGION SOMATIC DYSFUNCTION: ICD-10-CM

## 2024-02-22 DIAGNOSIS — M99.06 LOWER LIMB REGION SOMATIC DYSFUNCTION: ICD-10-CM

## 2024-02-22 DIAGNOSIS — M99.01 CERVICAL SOMATIC DYSFUNCTION: ICD-10-CM

## 2024-02-22 DIAGNOSIS — M99.09 SOMATIC DYSFUNCTION OF ABDOMINAL REGION: ICD-10-CM

## 2024-02-22 DIAGNOSIS — M99.08 RIB CAGE REGION SOMATIC DYSFUNCTION: ICD-10-CM

## 2024-02-22 DIAGNOSIS — S16.1XXD CERVICAL STRAIN, SUBSEQUENT ENCOUNTER: ICD-10-CM

## 2024-02-22 DIAGNOSIS — F07.81 POSTCONCUSSIVE SYNDROME: Primary | ICD-10-CM

## 2024-02-22 RX ORDER — MIRTAZAPINE 7.5 MG/1
22.5-45 TABLET, FILM COATED ORAL NIGHTLY
Qty: 150 TABLET | Refills: 0 | Status: SHIPPED | OUTPATIENT
Start: 2024-02-22

## 2024-02-22 NOTE — PROGRESS NOTES
PT/OT/ST NEURO ROUNDS    Patient Name: Estee Galan        Date: 2024  : 2000   YES Patient  Verified  Referring Practitioner: Dwight Putnam DO    PT:   Concussion symptoms improving, LBP continues to bother her.    ST:   Response time has improved significantly.  Working on executive function, using reminder jordan/scheduling apps to assist.    Therapists Present:  RAJESH Kirkpatrick, NICOLASA Torres, OTR/L  Susi Beltre MA,CCC-SLP
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
## 2024-02-26 ENCOUNTER — APPOINTMENT (OUTPATIENT)
Facility: HOSPITAL | Age: 24
End: 2024-02-26
Payer: MEDICAID

## 2024-02-26 ENCOUNTER — HOSPITAL ENCOUNTER (OUTPATIENT)
Facility: HOSPITAL | Age: 24
Setting detail: RECURRING SERIES
End: 2024-02-26
Payer: MEDICAID

## 2024-02-27 ENCOUNTER — HOSPITAL ENCOUNTER (OUTPATIENT)
Facility: HOSPITAL | Age: 24
Setting detail: RECURRING SERIES
End: 2024-02-27
Payer: MEDICAID

## 2024-02-29 ENCOUNTER — HOSPITAL ENCOUNTER (OUTPATIENT)
Facility: HOSPITAL | Age: 24
Setting detail: RECURRING SERIES
End: 2024-02-29
Payer: MEDICAID

## 2024-02-29 PROCEDURE — 97530 THERAPEUTIC ACTIVITIES: CPT

## 2024-02-29 PROCEDURE — 97110 THERAPEUTIC EXERCISES: CPT

## 2024-02-29 PROCEDURE — 97112 NEUROMUSCULAR REEDUCATION: CPT

## 2024-02-29 NOTE — PROGRESS NOTES
PHYSICAL / OCCUPATIONAL THERAPY - DAILY TREATMENT NOTE (updated )    Patient Name: Estee Galan    Date: 2024    : 2000  Insurance: Payor: [facility] MEDICAID / Plan: VEGA [facility] FAM HE[employer]KEEPERS PLUS / Product Type: *No Product type* /      Patient  verified Yes     Visit #   Current / Total 36 38-44   Time   In / Out 5:20 6:00   Pain   In / Out 4 lower back, 1 HA 3 lower back, 1 HA   Subjective Functional Status/Changes: Pt states that she feels like she is doing well for the most  part.  Pt  states that her back is the thing that bothers her most consistently at this point.     Next PN/ RC due 3/29/2024  (or 46th visit)  Auth due 8 visits, 2024-2024    TREATMENT AREA =  Unsteadiness on feet [R26.81]  Concussion [S06.0XAA]    OBJECTIVE    Therapeutic Procedures:    Tx Min Billable or 1:1 Min (if diff from Tx Min) Procedure, Rationale, Specifics   15  16588 Therapeutic Exercise (timed):  increase ROM, strength, coordination, balance, and proprioception to improve patient's ability to progress to PLOF and address remaining functional goals. (see flow sheet as applicable)     Details if applicable:  ROM/MMT, SLR 3-way     15  50693 Neuromuscular Re-Education (timed):  improve balance, coordination, kinesthetic sense, posture, core stability and proprioception to improve patient's ability to develop conscious control of individual muscles and awareness of position of extremities in order to progress to PLOF and address remaining functional goals. (see flow sheet as applicable)     Details if applicable:  CSI, VOMS   10  07004 Therapeutic Activity (timed):  use of dynamic activities replicating functional movements to increase ROM, strength, coordination, balance, and proprioception in order to improve patient's ability to progress to PLOF and address remaining functional goals.  (see flow sheet as applicable)     Details if applicable:  FOTO, bird dog             40  [facility] Totals  11:40 AM Antonina Gill PA-C [facility] Sched

## 2024-02-29 NOTE — PROGRESS NOTES
[facility] [place]  [address] [place] [address]   Phone: [phone] Fax: [phone]  PROGRESS NOTE  Patient Name: Estee Galan : 2000   Treatment/Medical Diagnosis: M54.6  THORACIC PAIN and M54.59  OTHER LOWER BACK PAIN and R26.81  Unsteadiness on feet  Concussion [S06.0XAA]   Referral Source: Dwight Putnam DO     Date of Initial Visit: 2023 concussion, 2023 thoracic/lumbar pain Attended Visits: 36 Missed Visits: 5     SUMMARY OF TREATMENT  Treatment has consisted of initial evaluations and 34 treatment sessions, which have included ROM/stretching/strengthening exercises, vestibular habituation exercises, static and dynamic balance training, modalities for pain relief and patient education (including HEP).  CURRENT STATUS  Patient's attendance has been inconsistent due to issues regarding insurance authorization.  Patient has progressed with exercises in clinic and demonstrates independence with current HEP.  Patient reports 1/10 headache pain and 3-4/10 lower back pain this session.  CSI has improved to 32/132.  VOMS WNL.  T/S FOTO improved to 54.  L/S ROM and LE strength improved.    CSI = 32/132     FOTO (T/S) = 54     Active Movements:  ROM % AROM % PROM Comments:pain, area   Forward flexion 40-60 75%   NE   Extension 20-30 25%   NE   SB right 20-30 100%   Stretching sensation   SB left 20-30 100%   Stretching sensation   Rotation right 5-10 100%   NE   Rotation left 5-10 100%   NE                                                                           Strength (0-5)  Hip Left Right   Flexion 4 4   Extension 4 4   Abduction 4 4   Adduction NT   NT   ER 4 4   IR 4 4   Knee Left Right   Extension 4 4   Flexion 5 5      Vestibular Ocular Motor Test          Not Tested Headache  (0-10) Dizzines  (0-10) Nausea  (0-10) Fogginess  (0-10) Totals Comments   Baseline Symptoms (0-10 scale)         1 1 0 0 2     Smooth Pursuits  care of your patient.    Fawn Belcher, PT       2/29/2024       6:06 PM

## 2024-03-05 ENCOUNTER — HOSPITAL ENCOUNTER (OUTPATIENT)
Facility: HOSPITAL | Age: 24
Setting detail: RECURRING SERIES
End: 2024-03-05
Payer: MEDICAID

## 2024-03-05 ENCOUNTER — TELEPHONE (OUTPATIENT)
Facility: HOSPITAL | Age: 24
End: 2024-03-05
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
## 2024-03-11 ENCOUNTER — HOSPITAL ENCOUNTER (OUTPATIENT)
Facility: HOSPITAL | Age: 24
Setting detail: RECURRING SERIES
Discharge: HOME | End: 2024-03-14
Payer: MEDICAID

## 2024-03-11 PROCEDURE — 97129 THER IVNTJ 1ST 15 MIN: CPT

## 2024-03-11 PROCEDURE — 97130 THER IVNTJ EA ADDL 15 MIN: CPT

## 2024-03-11 NOTE — PROGRESS NOTES
ST DAILY TREATMENT NOTE    Patient Name: Estee Galan  Date:3/11/2024  : 2000  [x]  Patient  Verified  Payor: Payor: [facility] MEDICAID / Plan: VEGA [facility] FAM HEALTHKEEPERS PLUS / Product Type: *No Product type* /   In time:12:42  Out time:1:22  Total Treatment Time (min): 40  Visit #: 4 of 8    Treatment Diagnosis: Cognitive communication deficit [R41.841]        SUBJECTIVE  Pain Level (0-10 scale): 3    Subjective functional status/changes:   [x] No changes reported  Pt reported improvement in overall cognitive function.     OBJECTIVE  Treatment provided includes:  Increase/Improve:  []  Voice Quality [x]  Cognitive Linguistic Skills []  Laryngeal/Pharyngeal Exercises   []  Vocal Loudness []  Reading Comprehension []  Swallowing Skills    []  Vocal Cord Function []  Auditory Comprehension []  Oral Motor Skills   []  Resonance []  Writing Skills [x]  Compensatory strategies    []  Speech Intelligibility []  Expressive Language []  Attention   []  Breath Support/Coord. []  Receptive language [x]  Memory   []  Articulation []  Safety Awareness [x] Pt educ   []  Fluency []  Word Retrieval []        Treatment Provided:  -RIPA-2 reassessment    Patient/Caregiver  Education: [x] Review HEP      HEP/Handouts given: Discharge instructions     Pain Level (0-10 scale) post treatment: 3    ASSESSMENT   SLP administered the/portions of the Ross Information Processing Assessment-2nd Edition, a comprehensive, norm-referenced, assessment battery designed to identify, describe, and quantify cognitive-linguistic problems. The following are the pt's performance in each subtest.      Subtest Raw Score Standardized Score Percentile Severity   I. Immediate Memory 28 15 95 Mild   II. Recent Memory 29 14 91 Mild   III. Temporal Orientation (Recent Memory) 29 13 84 Moderate   IV. Temporal Orientation (Remote Memory) Not assessed      V. Spatial Orientation Not assessed      VI. Orientation to Environment Not assessed    
Speech Therapy Discharge Instructions      [facility] - [facility]  [address], [place] [address] Ph:[phone] Fx: [phone]    Patient: Estee Galan  : 2000        1. Continue to utilize memory compensatory strategies (e.g., WRAP, calendars, planners, keywords, grouping/chunking, daily logs), auditory comprehension strategies (e.g., keywords, paraphrasing)    2. Continue to use apps to help keep track of your daily activities and appointments (e.g., reminder apps, calendar jordan)      3. Reach out to your doctor if you have any changes in cognition, memory, voice, swallowing, speech, and language for a possible re-referral to Speech Therapy as indicated by your doctor.     4. Call 911 if you experience sudden chest pain, weakness, slurred speech, seizures, bleeding, choking, facial drooping, etc.     5. Please reach out to us if you have any questions re: any discussed topics in Speech Therapy. Our number is [phone].      It has been a pleasure working with you,    Susi eBltre M.A., CCC-SLP  Speech Language Pathologist    
[phone] fax      Thank you

## 2024-03-28 ENCOUNTER — OFFICE VISIT (OUTPATIENT)
Age: 24
End: 2024-03-28
Payer: MEDICAID

## 2024-03-28 VITALS — WEIGHT: 107 LBS | BODY MASS INDEX: 17.83 KG/M2 | HEIGHT: 65 IN

## 2024-03-28 DIAGNOSIS — M99.02 THORACIC REGION SOMATIC DYSFUNCTION: ICD-10-CM

## 2024-03-28 DIAGNOSIS — F07.81 POSTCONCUSSIVE SYNDROME: Primary | ICD-10-CM
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
## 2024-03-28 DIAGNOSIS — M99.01 CERVICAL SOMATIC DYSFUNCTION: ICD-10-CM

## 2024-03-28 DIAGNOSIS — M99.07 UPPER EXTREMITY SOMATIC DYSFUNCTION: ICD-10-CM

## 2024-03-28 DIAGNOSIS — M99.05 PELVIC REGION SOMATIC DYSFUNCTION: ICD-10-CM

## 2024-03-28 DIAGNOSIS — M99.05 PELVIC SOMATIC DYSFUNCTION: ICD-10-CM

## 2024-03-28 DIAGNOSIS — S16.1XXD CERVICAL STRAIN, SUBSEQUENT ENCOUNTER: ICD-10-CM

## 2024-03-28 DIAGNOSIS — M99.06 LOWER LIMB REGION SOMATIC DYSFUNCTION: ICD-10-CM

## 2024-03-28 DIAGNOSIS — M99.04 SACRAL REGION SOMATIC DYSFUNCTION: ICD-10-CM

## 2024-03-28 DIAGNOSIS — M99.09 SOMATIC DYSFUNCTION OF ABDOMINAL REGION: ICD-10-CM

## 2024-03-28 DIAGNOSIS — M99.08 RIB CAGE REGION SOMATIC DYSFUNCTION: ICD-10-CM

## 2024-03-28 PROCEDURE — 99214 OFFICE O/P EST MOD 30 MIN: CPT | Performed by: FAMILY MEDICINE

## 2024-03-28 PROCEDURE — 98929 OSTEOPATH MANJ 9-10 REGIONS: CPT | Performed by: FAMILY MEDICINE

## 2024-03-28 RX ORDER — MIRTAZAPINE 7.5 MG/1
22.5 TABLET, FILM COATED ORAL NIGHTLY
Qty: 90 TABLET | Refills: 1 | Status: SHIPPED | OUTPATIENT
Start: 2024-03-28

## 2024-03-28 NOTE — PROGRESS NOTES
REGIONS      10. Lower limb region somatic dysfunction  OH OSTEOPATHIC MANIPULATIVE TX 9-10 BODY REGIONS      11. Somatic dysfunction of abdominal region  OH OSTEOPATHIC MANIPULATIVE TX 9-10 BODY REGIONS      12. Pelvic region somatic dysfunction  OH OSTEOPATHIC MANIPULATIVE TX 9-10 BODY REGIONS        Patient (or guardian if minor) verbalizes understanding of evaluation and plan.    Verbal consent obtained.  Cervical, Thoracic, Rib, Lumbar, Pelvic, Sacral, Upper Ext, Lower Ext, and Abdominal SD treated with HVLA, Myofascial and ME.  Correction of previous malalignments verified after Tx.    Pt tolerated well.  Notes improvement of Sx and pain is now rated 0/10.    HEP/stretches daily. Discussed stretching/strengthening/posture.    Will hopefully restart PT/speech and continue remeron 22.5mg as above and plan follow-up 4 weeks.

## 2024-05-02 ENCOUNTER — OFFICE VISIT (OUTPATIENT)
Age: 24
End: 2024-05-02
Payer: MEDICAID

## 2024-05-02 VITALS — WEIGHT: 105 LBS | RESPIRATION RATE: 16 BRPM | BODY MASS INDEX: 17.49 KG/M2 | HEIGHT: 65 IN

## 2024-05-02 DIAGNOSIS — F07.81 POSTCONCUSSIVE SYNDROME: Primary | ICD-10-CM
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
## 2024-05-02 DIAGNOSIS — M99.07 UPPER EXTREMITY SOMATIC DYSFUNCTION: ICD-10-CM

## 2024-05-02 DIAGNOSIS — M99.05 PELVIC SOMATIC DYSFUNCTION: ICD-10-CM

## 2024-05-02 DIAGNOSIS — M99.01 CERVICAL SOMATIC DYSFUNCTION: ICD-10-CM

## 2024-05-02 DIAGNOSIS — M99.06 LOWER LIMB REGION SOMATIC DYSFUNCTION: ICD-10-CM

## 2024-05-02 DIAGNOSIS — M99.08 RIB CAGE REGION SOMATIC DYSFUNCTION: ICD-10-CM

## 2024-05-02 DIAGNOSIS — M99.09 SOMATIC DYSFUNCTION OF ABDOMINAL REGION: ICD-10-CM

## 2024-05-02 DIAGNOSIS — M99.04 SACRAL REGION SOMATIC DYSFUNCTION: ICD-10-CM

## 2024-05-02 DIAGNOSIS — M99.05 PELVIC REGION SOMATIC DYSFUNCTION: ICD-10-CM

## 2024-05-02 DIAGNOSIS — M99.02 THORACIC REGION SOMATIC DYSFUNCTION: ICD-10-CM

## 2024-05-02 DIAGNOSIS — S16.1XXD CERVICAL STRAIN, SUBSEQUENT ENCOUNTER: ICD-10-CM

## 2024-05-02 PROCEDURE — 99213 OFFICE O/P EST LOW 20 MIN: CPT | Performed by: FAMILY MEDICINE

## 2024-05-02 PROCEDURE — 98929 OSTEOPATH MANJ 9-10 REGIONS: CPT | Performed by: FAMILY MEDICINE
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
